# Patient Record
Sex: FEMALE | Race: WHITE | Employment: OTHER | URBAN - METROPOLITAN AREA
[De-identification: names, ages, dates, MRNs, and addresses within clinical notes are randomized per-mention and may not be internally consistent; named-entity substitution may affect disease eponyms.]

---

## 2017-01-03 ENCOUNTER — HOSPITAL ENCOUNTER (EMERGENCY)
Age: 82
Discharge: HOME | End: 2017-01-03
Attending: EMERGENCY MEDICINE
Payer: MEDICARE

## 2017-01-03 VITALS
DIASTOLIC BLOOD PRESSURE: 43 MMHG | HEART RATE: 76 BPM | SYSTOLIC BLOOD PRESSURE: 118 MMHG | RESPIRATION RATE: 18 BRPM | WEIGHT: 98.5 LBS | OXYGEN SATURATION: 100 % | HEIGHT: 64 IN | TEMPERATURE: 98.2 F | BODY MASS INDEX: 16.82 KG/M2

## 2017-01-03 DIAGNOSIS — R19.5 GUAIAC POSITIVE STOOLS: ICD-10-CM

## 2017-01-03 DIAGNOSIS — D64.9 ANEMIA, UNSPECIFIED TYPE: Primary | ICD-10-CM

## 2017-01-03 LAB
ANION GAP BLD CALC-SCNC: 7 MMOL/L (ref 3–18)
APTT PPP: 33.2 SEC (ref 23–36.4)
BASOPHILS # BLD AUTO: 0.1 K/UL (ref 0–0.1)
BASOPHILS # BLD: 1 % (ref 0–2)
BUN SERPL-MCNC: 14 MG/DL (ref 7–18)
BUN/CREAT SERPL: 21 (ref 12–20)
CALCIUM SERPL-MCNC: 9.3 MG/DL (ref 8.5–10.1)
CHLORIDE SERPL-SCNC: 103 MMOL/L (ref 100–108)
CO2 SERPL-SCNC: 26 MMOL/L (ref 21–32)
CREAT SERPL-MCNC: 0.68 MG/DL (ref 0.6–1.3)
DIFFERENTIAL METHOD BLD: ABNORMAL
EOSINOPHIL # BLD: 0 K/UL (ref 0–0.4)
EOSINOPHIL NFR BLD: 0 % (ref 0–5)
ERYTHROCYTE [DISTWIDTH] IN BLOOD BY AUTOMATED COUNT: 15.1 % (ref 11.6–14.5)
GLUCOSE SERPL-MCNC: 92 MG/DL (ref 74–99)
HCT VFR BLD AUTO: 19.3 % (ref 35–45)
HGB BLD-MCNC: 5.8 G/DL (ref 12–16)
INR PPP: 2.1 (ref 0.8–1.2)
LYMPHOCYTES # BLD AUTO: 49 % (ref 21–52)
LYMPHOCYTES # BLD: 2.5 K/UL (ref 0.9–3.6)
MCH RBC QN AUTO: 25.8 PG (ref 24–34)
MCHC RBC AUTO-ENTMCNC: 30.1 G/DL (ref 31–37)
MCV RBC AUTO: 85.8 FL (ref 74–97)
MONOCYTES # BLD: 0.4 K/UL (ref 0.05–1.2)
MONOCYTES NFR BLD AUTO: 8 % (ref 3–10)
NEUTS SEG # BLD: 2.1 K/UL (ref 1.8–8)
NEUTS SEG NFR BLD AUTO: 42 % (ref 40–73)
PLATELET # BLD AUTO: 471 K/UL (ref 135–420)
PLATELET COMMENTS,PCOM: ABNORMAL
PMV BLD AUTO: 8.7 FL (ref 9.2–11.8)
POTASSIUM SERPL-SCNC: 3.9 MMOL/L (ref 3.5–5.5)
PROTHROMBIN TIME: 22.6 SEC (ref 11.5–15.2)
RBC # BLD AUTO: 2.25 M/UL (ref 4.2–5.3)
RBC MORPH BLD: ABNORMAL
SODIUM SERPL-SCNC: 136 MMOL/L (ref 136–145)
WBC # BLD AUTO: 5.1 K/UL (ref 4.6–13.2)

## 2017-01-03 PROCEDURE — 85025 COMPLETE CBC W/AUTO DIFF WBC: CPT | Performed by: EMERGENCY MEDICINE

## 2017-01-03 PROCEDURE — 99284 EMERGENCY DEPT VISIT MOD MDM: CPT

## 2017-01-03 PROCEDURE — 86900 BLOOD TYPING SEROLOGIC ABO: CPT | Performed by: EMERGENCY MEDICINE

## 2017-01-03 PROCEDURE — P9016 RBC LEUKOCYTES REDUCED: HCPCS | Performed by: EMERGENCY MEDICINE

## 2017-01-03 PROCEDURE — 80048 BASIC METABOLIC PNL TOTAL CA: CPT | Performed by: EMERGENCY MEDICINE

## 2017-01-03 PROCEDURE — 85730 THROMBOPLASTIN TIME PARTIAL: CPT | Performed by: EMERGENCY MEDICINE

## 2017-01-03 PROCEDURE — 85610 PROTHROMBIN TIME: CPT | Performed by: EMERGENCY MEDICINE

## 2017-01-03 PROCEDURE — 36430 TRANSFUSION BLD/BLD COMPNT: CPT

## 2017-01-03 PROCEDURE — 86920 COMPATIBILITY TEST SPIN: CPT | Performed by: EMERGENCY MEDICINE

## 2017-01-03 PROCEDURE — 77030013169 SET IV BLD ICUM -A

## 2017-01-03 RX ORDER — SODIUM CHLORIDE 9 MG/ML
250 INJECTION, SOLUTION INTRAVENOUS AS NEEDED
Status: DISCONTINUED | OUTPATIENT
Start: 2017-01-03 | End: 2017-01-04 | Stop reason: HOSPADM

## 2017-01-03 NOTE — ED PROVIDER NOTES
HPI Comments: 12:12 PM Richard Ellis is a 80 y.o. female with a history of colon cancer who presents to the emergency department from chemotherapy stating she needs a blood transfusion. Pt states she was at chemo and they told her to come to the ED. Pt last received chemo on Tuesday. Pt claims that she has not seen hematochezia or melena. Pt has no SOB, CP, or dizziness. Pt takes xarelto for blood clots due to having a h/o one in abd. No other concerns at this time. Pt has a hemaglobin of 5.7;  requesting 2 units from Dr. Rahel Rosales    PCP: Farrah Kaur. Cat Ceja MD      The history is provided by the patient. Past Medical History:   Diagnosis Date    Gall bladder stones     Herniated disc     Hyperlipidemia     Rapid heartbeat        Past Surgical History:   Procedure Laterality Date    Hx cholecystectomy      Hx back surgery       x2         Family History:   Problem Relation Age of Onset    Kidney Disease Mother     Other Father      stomach ulcer       Social History     Social History    Marital status:      Spouse name: N/A    Number of children: N/A    Years of education: N/A     Occupational History    Not on file. Social History Main Topics    Smoking status: Never Smoker    Smokeless tobacco: Never Used    Alcohol use No    Drug use: No    Sexual activity: Not on file     Other Topics Concern    Not on file     Social History Narrative         ALLERGIES: Review of patient's allergies indicates no known allergies. Review of Systems   Respiratory: Negative for shortness of breath. Cardiovascular: Negative for chest pain. Gastrointestinal: Negative for abdominal pain and blood in stool. Neurological: Negative for light-headedness. All other systems reviewed and are negative.       Vitals:    01/03/17 1103 01/03/17 1230 01/03/17 1300   BP: 110/50 118/40 119/41   Pulse: 64 68 67   Resp: 18 15 13   Temp: 97.4 °F (36.3 °C)     SpO2: 100% 100% 99%   Weight: 44.7 kg (98 lb 8 oz)     Height: 5' 4\" (1.626 m)              Physical Exam   Constitutional: She is oriented to person, place, and time. She appears well-developed and well-nourished. HENT:   Head: Normocephalic and atraumatic. Neck: Neck supple. No JVD present. Cardiovascular: Normal rate and regular rhythm. Pulmonary/Chest: Effort normal and breath sounds normal. No respiratory distress. Abdominal: Soft. She exhibits no distension. There is no tenderness. There is no rebound and no guarding. Genitourinary:   Genitourinary Comments: Brown stool, guaiac positive, external hemorrhoid, non-thrombosed   Musculoskeletal: She exhibits no edema. External hemorrhoids, non thrombosis      Neurological: She is alert and oriented to person, place, and time. Skin: Skin is warm and dry. No erythema. There is pallor. Psychiatric: Judgment normal.        MDM  Number of Diagnoses or Management Options  Diagnosis management comments: 79 y/o female presents with asymptomatic anemia  Will check labs, plan for transfusion. Noted guaiac positive stool, not grossly bloody, will need GI follow up. Amount and/or Complexity of Data Reviewed  Clinical lab tests: ordered and reviewed      ED Course       Procedures  Vitals:  Patient Vitals for the past 12 hrs:   Temp Pulse Resp BP SpO2   01/03/17 1300 - 67 13 119/41 99 %   01/03/17 1230 - 68 15 118/40 100 %   01/03/17 1103 97.4 °F (36.3 °C) 64 18 110/50 100 %   100%. Percentage is within normal limits. Medications ordered:   Medications   0.9% sodium chloride infusion 250 mL (not administered)         Progress notes, Consult notes or additional Procedure notes:   2:47 PM Consult with Dr. Ainsley Patel, hematology, requests to transfuse 2 units then d/c home. Will f/u about positive stool. Pt hemodynamically stable, asymptomatic anemia, given 2u PRBC. Discharged to home. Discussed return precautions with pt. Disposition:  Diagnosis:   1.  Anemia, unspecified type 2. Guaiac positive stools        Disposition: Discharge    Follow-up Information     None        Scribe Attestation:     Kellee Griffin, scribing for and in the presence of Isa Frank,  January 03, 2017 at 2:49 PM     Signed by: Belkys Silva, January 03, 2017 at 2:49 PM     Physician Attestation:   I personally performed the services described in this documentation, reviewed and edited the documentation which was dictated to the scribe in my presence, and it accurately records my words and actions.  Isa Frank,   January 03, 2017

## 2017-01-03 NOTE — ED NOTES
[facility] EMERGENCY DEPT      80 y.o. female with noted past medical history who presents to the emergency department stating she had abnl lab work and needs blood transfusion. I performed a brief evaluation, including history and physical, of the patient here in triage and I have determined that pt will need further treatment and evaluation from the main side ER physician. I have placed initial orders to help in expediting patients care. Devin Anne M.D.   [facility] Board Certified Emergency Physician

## 2017-01-03 NOTE — ED NOTES
Hourly rounding complete. Safety  Pt resting   [ x ]  On stretcher with side rails up and bed in locked position, call bell within reach  [  ]  Sitting in chair call bell within reach  [x  ]  Family at bedside  [  ] Sitting in recliner , wheels locked, call bell within reach  [  ] Sitting in results waiting area    Toileting  [x  ] pt denies need to use bathroom  [  ] pt assisted to bathroom  [  ] pt assisted with  [  ] bedpan     [  ] commode  [  ] pt independent to bathroom as needed  [  ] Urinal at bedside  [  ] Schmidt monitored    [  ] Pt incontinent of [  ] urine [  Alena Pare. Carmel care given. Ongoing Plan of Care  Plan of care and expected time for test and results reviewed with  [ x  ] Patient  [ x ] Family.     Pain Management / Comfort  [  ] dimmed lights  [ x ] warm blanket  [x  ]provided   [   ] offered  [  ] pain assessed  [  ] monitor alarms reviewed  [  ] dietary tray [  ] provided   [  ] offered and refused

## 2017-01-04 LAB
ABO + RH BLD: NORMAL
BLD PROD TYP BPU: NORMAL
BLD PROD TYP BPU: NORMAL
BLOOD GROUP ANTIBODIES SERPL: NORMAL
BPU ID: NORMAL
BPU ID: NORMAL
CALLED TO:,BCALL1: NORMAL
CROSSMATCH RESULT,%XM: NORMAL
CROSSMATCH RESULT,%XM: NORMAL
SPECIMEN EXP DATE BLD: NORMAL
STATUS OF UNIT,%ST: NORMAL
STATUS OF UNIT,%ST: NORMAL
UNIT DIVISION, %UDIV: 0
UNIT DIVISION, %UDIV: 0

## 2017-01-04 NOTE — DISCHARGE INSTRUCTIONS
Anemia: Care Instructions  Your Care Instructions    Anemia is a low level of red blood cells, which carry oxygen throughout your body. Many things can cause anemia. Lack of iron is one of the most common causes. Your body needs iron to make hemoglobin, a substance in red blood cells that carries oxygen from the lungs to your body's cells. Without enough iron, the body produces fewer and smaller red blood cells. As a result, your body's cells do not get enough oxygen, and you feel tired and weak. And you may have trouble concentrating. Bleeding is the most common cause of a lack of iron. You may have heavy menstrual bleeding or bleeding caused by conditions such as ulcers, hemorrhoids, or cancer. Regular use of aspirin or other anti-inflammatory medicines (such as ibuprofen) also can cause bleeding in some people. A lack of iron in your diet also can cause anemia, especially at times when the body needs more iron, such as during pregnancy, infancy, and the teen years. Your doctor may have prescribed iron pills. It may take several months of treatment for your iron levels to return to normal. Your doctor also may suggest that you eat foods that are rich in iron, such as meat and beans. There are many other causes of anemia. It is not always due to a lack of iron. Finding the specific cause of your anemia will help your doctor find the right treatment for you. Follow-up care is a key part of your treatment and safety. Be sure to make and go to all appointments, and call your doctor if you are having problems. It's also a good idea to know your test results and keep a list of the medicines you take. How can you care for yourself at home? · Take your medicines exactly as prescribed. Call your doctor if you think you are having a problem with your medicine. · If your doctor recommends iron pills, take them as directed:  ¨ Try to take the pills on an empty stomach about 1 hour before or 2 hours after meals. But you may need to take iron with food to avoid an upset stomach. ¨ Do not take antacids or drink milk or caffeine drinks (such as coffee, tea, or cola) at the same time or within 2 hours of the time that you take your iron. They can make it hard for your body to absorb the iron. ¨ Vitamin C (from food or supplements) helps your body absorb iron. Try taking iron pills with a glass of orange juice or some other food that is high in vitamin C, such as citrus fruits. ¨ Iron pills may cause stomach problems, such as heartburn, nausea, diarrhea, constipation, and cramps. Be sure to drink plenty of fluids, and include fruits, vegetables, and fiber in your diet each day. Iron pills often make your bowel movements dark or green. ¨ If you forget to take an iron pill, do not take a double dose of iron the next time you take a pill. ¨ Keep iron pills out of the reach of small children. An overdose of iron can be very dangerous. · Follow your doctor's advice about eating iron-rich foods. These include red meat, shellfish, poultry, eggs, beans, raisins, whole-grain bread, and leafy green vegetables. · Steam vegetables to help them keep their iron content. When should you call for help? Call 911 anytime you think you may need emergency care. For example, call if:  · You have symptoms of a heart attack. These may include:  ¨ Chest pain or pressure, or a strange feeling in the chest.  ¨ Sweating. ¨ Shortness of breath. ¨ Nausea or vomiting. ¨ Pain, pressure, or a strange feeling in the back, neck, jaw, or upper belly or in one or both shoulders or arms. ¨ Lightheadedness or sudden weakness. ¨ A fast or irregular heartbeat. After you call 911, the  may tell you to chew 1 adult-strength or 2 to 4 low-dose aspirin. Wait for an ambulance. Do not try to drive yourself. · You passed out (lost consciousness).   Call your doctor now or seek immediate medical care if:  · You have new or increased shortness of breath. · You are dizzy or lightheaded, or you feel like you may faint. · Your fatigue and weakness continue or get worse. · You have any abnormal bleeding, such as:  ¨ Nosebleeds. ¨ Vaginal bleeding that is different (heavier, more frequent, at a different time of the month) than what you are used to. ¨ Bloody or black stools, or rectal bleeding. ¨ Bloody or pink urine. Watch closely for changes in your health, and be sure to contact your doctor if:  · You do not get better as expected. Where can you learn more? Go to http://garrett-ya.info/. Enter R301 in the search box to learn more about \"Anemia: Care Instructions. \"  Current as of: February 5, 2016  Content Version: 11.1  © 9443-9688 [employer]. Care instructions adapted under license by [employer] (which disclaims liability or warranty for this information). If you have questions about a medical condition or this instruction, always ask your healthcare professional. [address] any warranty or liability for your use of this information.

## 2017-01-04 NOTE — ED NOTES
Received nursing report from Davonte Mallory, [facility]. Patient is A/O x4, resting comfortably on the stretcher at this time. Patient awaiting blood transfusion to finish then will be discharged.

## 2017-01-15 ENCOUNTER — APPOINTMENT (OUTPATIENT)
Dept: CT IMAGING | Age: 82
DRG: 371 | End: 2017-01-15
Attending: EMERGENCY MEDICINE
Payer: MEDICARE

## 2017-01-15 ENCOUNTER — APPOINTMENT (OUTPATIENT)
Dept: GENERAL RADIOLOGY | Age: 82
DRG: 371 | End: 2017-01-15
Attending: PHYSICIAN ASSISTANT
Payer: MEDICARE

## 2017-01-15 ENCOUNTER — HOSPITAL ENCOUNTER (INPATIENT)
Age: 82
LOS: 1 days | Discharge: HOME | DRG: 371 | End: 2017-01-17
Attending: EMERGENCY MEDICINE | Admitting: EMERGENCY MEDICINE
Payer: MEDICARE

## 2017-01-15 DIAGNOSIS — R10.11 ABDOMINAL PAIN, RIGHT UPPER QUADRANT: ICD-10-CM

## 2017-01-15 DIAGNOSIS — I81 PORTAL VEIN THROMBOSIS: Primary | ICD-10-CM

## 2017-01-15 DIAGNOSIS — R18.8 OTHER ASCITES: ICD-10-CM

## 2017-01-15 DIAGNOSIS — C18.2 MALIGNANT NEOPLASM OF ASCENDING COLON (HCC): ICD-10-CM

## 2017-01-15 DIAGNOSIS — R11.2 NAUSEA AND VOMITING, INTRACTABILITY OF VOMITING NOT SPECIFIED, UNSPECIFIED VOMITING TYPE: ICD-10-CM

## 2017-01-15 PROBLEM — Z92.89 HISTORY OF BLOOD TRANSFUSION: Status: ACTIVE | Noted: 2017-01-03

## 2017-01-15 LAB
ABO + RH BLD: NORMAL
ALBUMIN SERPL BCP-MCNC: 2.7 G/DL (ref 3.4–5)
ALBUMIN/GLOB SERPL: 0.8 {RATIO} (ref 0.8–1.7)
ALP SERPL-CCNC: 95 U/L (ref 45–117)
ALT SERPL-CCNC: 65 U/L (ref 13–56)
ANION GAP BLD CALC-SCNC: 10 MMOL/L (ref 3–18)
APPEARANCE UR: CLEAR
AST SERPL W P-5'-P-CCNC: 109 U/L (ref 15–37)
BACTERIA URNS QL MICRO: NEGATIVE /HPF
BASOPHILS # BLD AUTO: 0 K/UL (ref 0–0.1)
BASOPHILS # BLD: 0 % (ref 0–2)
BILIRUB SERPL-MCNC: 0.5 MG/DL (ref 0.2–1)
BILIRUB UR QL: NEGATIVE
BLOOD GROUP ANTIBODIES SERPL: NORMAL
BUN SERPL-MCNC: 14 MG/DL (ref 7–18)
BUN/CREAT SERPL: 25 (ref 12–20)
CALCIUM SERPL-MCNC: 8 MG/DL (ref 8.5–10.1)
CHLORIDE SERPL-SCNC: 104 MMOL/L (ref 100–108)
CO2 SERPL-SCNC: 26 MMOL/L (ref 21–32)
COLOR UR: YELLOW
CREAT SERPL-MCNC: 0.55 MG/DL (ref 0.6–1.3)
DIFFERENTIAL METHOD BLD: ABNORMAL
EOSINOPHIL # BLD: 0 K/UL (ref 0–0.4)
EOSINOPHIL NFR BLD: 1 % (ref 0–5)
EPITH CASTS URNS QL MICRO: ABNORMAL /LPF (ref 0–5)
ERYTHROCYTE [DISTWIDTH] IN BLOOD BY AUTOMATED COUNT: 16.1 % (ref 11.6–14.5)
GLOBULIN SER CALC-MCNC: 3.4 G/DL (ref 2–4)
GLUCOSE SERPL-MCNC: 91 MG/DL (ref 74–99)
GLUCOSE UR STRIP.AUTO-MCNC: NEGATIVE MG/DL
HCT VFR BLD AUTO: 30.4 % (ref 35–45)
HGB BLD-MCNC: 9.8 G/DL (ref 12–16)
HGB UR QL STRIP: ABNORMAL
KETONES UR QL STRIP.AUTO: 80 MG/DL
LACTATE BLD-SCNC: 0.4 MMOL/L (ref 0.4–2)
LACTATE BLD-SCNC: 1 MMOL/L (ref 0.4–2)
LEUKOCYTE ESTERASE UR QL STRIP.AUTO: ABNORMAL
LIPASE SERPL-CCNC: 91 U/L (ref 73–393)
LYMPHOCYTES # BLD AUTO: 47 % (ref 21–52)
LYMPHOCYTES # BLD: 1.4 K/UL (ref 0.9–3.6)
MCH RBC QN AUTO: 28.2 PG (ref 24–34)
MCHC RBC AUTO-ENTMCNC: 32.2 G/DL (ref 31–37)
MCV RBC AUTO: 87.4 FL (ref 74–97)
MONOCYTES # BLD: 0.1 K/UL (ref 0.05–1.2)
MONOCYTES NFR BLD AUTO: 3 % (ref 3–10)
MUCOUS THREADS URNS QL MICRO: ABNORMAL /LPF
NEUTS SEG # BLD: 1.5 K/UL (ref 1.8–8)
NEUTS SEG NFR BLD AUTO: 49 % (ref 40–73)
NITRITE UR QL STRIP.AUTO: NEGATIVE
PH UR STRIP: 5.5 [PH] (ref 5–8)
PLATELET # BLD AUTO: 274 K/UL (ref 135–420)
PMV BLD AUTO: 8.9 FL (ref 9.2–11.8)
POTASSIUM SERPL-SCNC: 3 MMOL/L (ref 3.5–5.5)
PROT SERPL-MCNC: 6.1 G/DL (ref 6.4–8.2)
PROT UR STRIP-MCNC: NEGATIVE MG/DL
RBC # BLD AUTO: 3.48 M/UL (ref 4.2–5.3)
RBC #/AREA URNS HPF: ABNORMAL /HPF (ref 0–5)
SODIUM SERPL-SCNC: 140 MMOL/L (ref 136–145)
SP GR UR REFRACTOMETRY: >1.03 (ref 1–1.03)
SPECIMEN EXP DATE BLD: NORMAL
UROBILINOGEN UR QL STRIP.AUTO: 0.2 EU/DL (ref 0.2–1)
WBC # BLD AUTO: 3 K/UL (ref 4.6–13.2)
WBC URNS QL MICRO: ABNORMAL /HPF (ref 0–4)

## 2017-01-15 PROCEDURE — 74177 CT ABD & PELVIS W/CONTRAST: CPT

## 2017-01-15 PROCEDURE — 74011250636 HC RX REV CODE- 250/636: Performed by: PHYSICIAN ASSISTANT

## 2017-01-15 PROCEDURE — 96375 TX/PRO/DX INJ NEW DRUG ADDON: CPT

## 2017-01-15 PROCEDURE — 96374 THER/PROPH/DIAG INJ IV PUSH: CPT

## 2017-01-15 PROCEDURE — 82272 OCCULT BLD FECES 1-3 TESTS: CPT | Performed by: INTERNAL MEDICINE

## 2017-01-15 PROCEDURE — 81001 URINALYSIS AUTO W/SCOPE: CPT | Performed by: PHYSICIAN ASSISTANT

## 2017-01-15 PROCEDURE — 74011000258 HC RX REV CODE- 258: Performed by: EMERGENCY MEDICINE

## 2017-01-15 PROCEDURE — 93005 ELECTROCARDIOGRAM TRACING: CPT

## 2017-01-15 PROCEDURE — 86900 BLOOD TYPING SEROLOGIC ABO: CPT | Performed by: PHYSICIAN ASSISTANT

## 2017-01-15 PROCEDURE — 96361 HYDRATE IV INFUSION ADD-ON: CPT

## 2017-01-15 PROCEDURE — 83605 ASSAY OF LACTIC ACID: CPT

## 2017-01-15 PROCEDURE — 87493 C DIFF AMPLIFIED PROBE: CPT | Performed by: PHYSICIAN ASSISTANT

## 2017-01-15 PROCEDURE — 74011000250 HC RX REV CODE- 250: Performed by: EMERGENCY MEDICINE

## 2017-01-15 PROCEDURE — 85025 COMPLETE CBC W/AUTO DIFF WBC: CPT | Performed by: PHYSICIAN ASSISTANT

## 2017-01-15 PROCEDURE — 83690 ASSAY OF LIPASE: CPT | Performed by: PHYSICIAN ASSISTANT

## 2017-01-15 PROCEDURE — 80053 COMPREHEN METABOLIC PANEL: CPT | Performed by: PHYSICIAN ASSISTANT

## 2017-01-15 PROCEDURE — 74011250636 HC RX REV CODE- 250/636: Performed by: EMERGENCY MEDICINE

## 2017-01-15 PROCEDURE — 99285 EMERGENCY DEPT VISIT HI MDM: CPT

## 2017-01-15 PROCEDURE — 74011636320 HC RX REV CODE- 636/320: Performed by: EMERGENCY MEDICINE

## 2017-01-15 RX ORDER — LANOLIN ALCOHOL/MO/W.PET/CERES
325 CREAM (GRAM) TOPICAL 2 TIMES DAILY
COMMUNITY
Start: 2016-10-07

## 2017-01-15 RX ORDER — SODIUM CHLORIDE 9 MG/ML
75 INJECTION, SOLUTION INTRAVENOUS CONTINUOUS
Status: DISCONTINUED | OUTPATIENT
Start: 2017-01-15 | End: 2017-01-16

## 2017-01-15 RX ORDER — GABAPENTIN 300 MG/1
1 CAPSULE ORAL 3 TIMES DAILY
Refills: 0 | COMMUNITY
Start: 2016-11-01

## 2017-01-15 RX ORDER — ONDANSETRON 4 MG/1
1 TABLET, ORALLY DISINTEGRATING ORAL
Refills: 0 | COMMUNITY
Start: 2016-10-25 | End: 2017-01-15

## 2017-01-15 RX ORDER — CALCIUM CARBONATE/VITAMIN D3 600MG-5MCG
1 TABLET ORAL 2 TIMES DAILY
COMMUNITY

## 2017-01-15 RX ORDER — OXYCODONE AND ACETAMINOPHEN 5; 325 MG/1; MG/1
1 TABLET ORAL
Refills: 0 | COMMUNITY
Start: 2016-11-08 | End: 2017-01-17

## 2017-01-15 RX ORDER — ASPIRIN 325 MG
500 TABLET, DELAYED RELEASE (ENTERIC COATED) ORAL DAILY
COMMUNITY

## 2017-01-15 RX ORDER — MORPHINE SULFATE 4 MG/ML
4 INJECTION, SOLUTION INTRAMUSCULAR; INTRAVENOUS ONCE
Status: COMPLETED | OUTPATIENT
Start: 2017-01-15 | End: 2017-01-15

## 2017-01-15 RX ADMIN — LIDOCAINE HYDROCHLORIDE: 10 INJECTION, SOLUTION EPIDURAL; INFILTRATION; INTRACAUDAL; PERINEURAL at 21:26

## 2017-01-15 RX ADMIN — Medication 4 MG: at 21:23

## 2017-01-15 RX ADMIN — SODIUM CHLORIDE 250 ML: 900 INJECTION, SOLUTION INTRAVENOUS at 21:19

## 2017-01-15 RX ADMIN — IOPAMIDOL 80 ML: 612 INJECTION, SOLUTION INTRAVENOUS at 20:31

## 2017-01-15 NOTE — IP AVS SNAPSHOT
[address] [address] [facility] Patient: Yolette Coe MRN: [id number] WEJ:[id number][birth year]10[id number] You are allergic to the following No active allergies Recent Documentation Height Weight BMI OB Status Smoking Status 1.626 m 44.9 kg 16.99 kg/m2 Postmenopausal Never Smoker Emergency Contacts Name Discharge Info Relation Home Work Mobile [address] CAREGIVER [3] Child [2] [phone] Richard Sierra DISCHARGE CAREGIVER [3] Spouse [3] [phone] About your hospitalization You were admitted on:  January 16, 2017 You last received care in the:  [facility] [address] You were discharged on:  January 17, 2017 Unit phone number:  [phone] Why you were hospitalized Your primary diagnosis was:  Not on File Your diagnoses also included:  Portal Vein Thrombosis, Nausea & Vomiting, Generalized Abdominal Pain, Hypertension, Anemia, Abdominal Pain, Adenocarcinoma Of Cecum (Hcc) Providers Seen During Your Hospitalizations Provider Role Specialty Primary office phone Nathalie Hubbard MD Attending Provider Emergency Medicine [phone] Gloria Coleman MD Attending Provider Emergency Medicine [phone] Demetrios Landau, MD Attending Provider Internal Medicine Number not on file Your Primary Care Physician (PCP) Primary Care Physician Office Phone Office Fax [phone] Follow-up Information Follow up With Details Comments Contact Info Ramirez Begum MD On 1/20/2017 @ 2:00 [address] [facility] 
[phone] Oleg Coates MD On 1/24/2017 @ 10:30 [address] [facility] 
[phone] Your Appointments Monday January 23, 2017 COLONOSCOPY, ENDOSCOPY with Bernardo Bundy MD  
[facility] ENDOSCOPY [address] [place] [address] ) 0 [facility] [address] Current Discharge Medication List  
  
START taking these medications Dose & Instructions Dispensing Information Comments Morning Noon Evening Bedtime  
 metroNIDAZOLE 500 mg tablet Commonly known as:  FLAGYL Your next dose is: Today, Tomorrow Other:  _________ Dose:  500 mg Take 1 Tab by mouth three (3) times daily for 10 days. Quantity:  30 Tab Refills:  0  
     
   
   
   
  
 OTHER Your next dose is: Today, Tomorrow Other:  _________ Check CBC, CMP, Mg in 4 days, results to PCP immediately. Diagnosis- colitis Quantity:  1 Each Refills:  0 Saccharomyces boulardii 250 mg capsule Commonly known as:  Blake Moser Your next dose is: Today, Tomorrow Other:  _________ Dose:  250 mg Take 1 Cap by mouth two (2) times a day for 10 days. Quantity:  20 Cap Refills:  0 CONTINUE these medications which have NOT CHANGED Dose & Instructions Dispensing Information Comments Morning Noon Evening Bedtime  
 aspirin 81 mg tablet Your next dose is: Today, Tomorrow Other:  _________ Dose:  81 mg Take 81 mg by mouth daily. Refills:  0  
     
   
   
   
  
 calcium-vitamin D 600 mg(1,500mg) -200 unit Tab Your next dose is: Today, Tomorrow Other:  _________ Dose:  1 Tab Take 1 Tab by mouth two (2) times a day. Refills:  0 CENTRUM SILVER Tab tablet Generic drug:  multivitamins-minerals-lutein Your next dose is: Today, Tomorrow Other:  _________ Dose:  1 Tab Take 1 Tab by mouth daily. Refills:  0  
     
   
   
   
  
 ferrous sulfate 325 mg (65 mg iron) tablet Your next dose is: Today, Tomorrow Other:  _________  Dose:  325 mg  
 Take 325 mg by mouth two (2) times a day. Refills:  0  
     
   
   
   
  
 FISH OIL 60- mg Cap Generic drug:  omega 3-dha-epa-fish oil Your next dose is: Today, Tomorrow Other:  _________ Dose:  500 mg Take 500 mg by mouth daily. Refills:  0  
     
   
   
   
  
 gabapentin 300 mg capsule Commonly known as:  NEURONTIN Your next dose is: Today, Tomorrow Other:  _________ Dose:  1 Cap Take 1 Cap by mouth three (3) times daily. 11/1/16, QTY#120, 30 days. Dr. Cee Makenna Refills:  0  
     
   
   
   
  
 TOPROL XL 25 mg XL tablet Generic drug:  metoprolol succinate Your next dose is: Today, Tomorrow Other:  _________ Dose:  25 mg Take 25 mg by mouth daily. Refills:  0 XARELTO 20 mg Tab tablet Generic drug:  rivaroxaban Your next dose is: Today, Tomorrow Other:  _________ Dose:  20 mg Take 20 mg by mouth daily. Indications: PREVENTION OF DEEP VEIN THROMBOSIS RECURRENCE Refills:  0 ZOCOR 20 mg tablet Generic drug:  simvastatin Your next dose is: Today, Tomorrow Other:  _________ Dose:  20 mg Take 20 mg by mouth nightly. Refills:  0 STOP taking these medications   
 calcium 600 mg Cap FISH OIL 1,000 mg Cap Generic drug:  omega-3 fatty acids-vitamin e  
   
  
 oxyCODONE-acetaminophen 5-325 mg per tablet Commonly known as:  PERCOCET ASK your doctor about these medications Dose & Instructions Dispensing Information Comments Morning Noon Evening Bedtime CRANBERRY CONCENTRATE Cap Generic drug:  vitamin c-vitamin e Your next dose is: Today, Tomorrow Other:  _________ Dose:  2 Cap Take 2 Caps by mouth two (2) times a day. Refills:  0 Where to Get Your Medications Information on where to get these meds will be given to you by the nurse or doctor. ! Ask your nurse or doctor about these medications  
  metroNIDAZOLE 500 mg tablet OTHER Saccharomyces boulardii 250 mg capsule Discharge Instructions Abdominal Pain: Care Instructions Your Care Instructions Abdominal pain has many possible causes. Some aren't serious and get better on their own in a few days. Others need more testing and treatment. If your pain continues or gets worse, you need to be rechecked and may need more tests to find out what is wrong. You may need surgery to correct the problem. Don't ignore new symptoms, such as fever, nausea and vomiting, urination problems, pain that gets worse, and dizziness. These may be signs of a more serious problem. Your doctor may have recommended a follow-up visit in the next 8 to 12 hours. If you are not getting better, you may need more tests or treatment. The doctor has checked you carefully, but problems can develop later. If you notice any problems or new symptoms, get medical treatment right away. Follow-up care is a key part of your treatment and safety. Be sure to make and go to all appointments, and call your doctor if you are having problems. It's also a good idea to know your test results and keep a list of the medicines you take. How can you care for yourself at home? · Rest until you feel better. · To prevent dehydration, drink plenty of fluids, enough so that your urine is light yellow or clear like water. Choose water and other caffeine-free clear liquids until you feel better. If you have kidney, heart, or liver disease and have to limit fluids, talk with your doctor before you increase the amount of fluids you drink. · If your stomach is upset, eat mild foods, such as rice, dry toast or crackers, bananas, and applesauce. Try eating several small meals instead of two or three large ones. · Wait until 48 hours after all symptoms have gone away before you have spicy foods, alcohol, and drinks that contain caffeine. · Do not eat foods that are high in fat. · Avoid anti-inflammatory medicines such as aspirin, ibuprofen (Advil, Motrin), and naproxen (Aleve). These can cause stomach upset. Talk to your doctor if you take daily aspirin for another health problem. When should you call for help? Call 911 anytime you think you may need emergency care. For example, call if: 
· You passed out (lost consciousness). · You pass maroon or very bloody stools. · You vomit blood or what looks like coffee grounds. · You have new, severe belly pain. Call your doctor now or seek immediate medical care if: 
· Your pain gets worse, especially if it becomes focused in one area of your belly. · You have a new or higher fever. · Your stools are black and look like tar, or they have streaks of blood. · You have unexpected vaginal bleeding. · You have symptoms of a urinary tract infection. These may include: 
¨ Pain when you urinate. ¨ Urinating more often than usual. 
¨ Blood in your urine. · You are dizzy or lightheaded, or you feel like you may faint. Watch closely for changes in your health, and be sure to contact your doctor if: 
· You are not getting better after 1 day (24 hours). Where can you learn more? Go to http://garrett-ya.info/. Enter U293 in the search box to learn more about \"Abdominal Pain: Care Instructions. \" Current as of: May 27, 2016 Content Version: 11.1 © 4523-1258 PhoneFusion. Care instructions adapted under license by [employer] (which disclaims liability or warranty for this information). If you have questions about a medical condition or this instruction, always ask your healthcare professional. [address] [address] any warranty or liability for your use of this information. Aero Glasst Activation Thank you for requesting access to manetch. Please follow the instructions below to securely access and download your online medical record. manetch allows you to send messages to your doctor, view your test results, renew your prescriptions, schedule appointments, and more. How Do I Sign Up? 1. In your internet browser, go to [email or web address] 
2. Click on the First Time User? Click Here link in the Sign In box. You will be redirect to the New Member Sign Up page. 3. Enter your manetch Access Code exactly as it appears below. You will not need to use this code after youve completed the sign-up process. If you do not sign up before the expiration date, you must request a new code. manetch Access Code: [email or web address] Expires: 4/3/2017 12:17 PM (This is the date your manetch access code will ) 4. Enter the last four digits of your Social Security Number (xxxx) and Date of Birth (mm/dd/yyyy) as indicated and click Submit. You will be taken to the next sign-up page. 5. Create a manetch ID. This will be your manetch login ID and cannot be changed, so think of one that is secure and easy to remember. 6. Create a manetch password. You can change your password at any time. 7. Enter your Password Reset Question and Answer. This can be used at a later time if you forget your password. 8. Enter your e-mail address. You will receive e-mail notification when new information is available in [address] 9. Click Sign Up. You can now view and download portions of your medical record. 10. Click the Download Summary menu link to download a portable copy of your medical information. Additional Information If you have questions, please visit the Frequently Asked Questions section of the manetch website at [email or web address] "[employer]. Advanced [employer] Design/"Glossi[employer]"hart/. Remember, manetch is NOT to be used for urgent needs. For medical emergencies, dial 911. DISCHARGE SUMMARY from Nurse The following personal items are in your possession at time of discharge: 
 
Dental Appliances: None Visual Aid: Glasses Home Medications: None Jewelry: None Clothing: At bedside Other Valuables: None PATIENT INSTRUCTIONS: 
 
 
F-face looks uneven A-arms unable to move or move unevenly S-speech slurred or non-existent T-time-call 911 as soon as signs and symptoms begin-DO NOT go Back to bed or wait to see if you get better-TIME IS BRAIN. Warning Signs of HEART ATTACK Call 911 if you have these symptoms: 
? Chest discomfort. Most heart attacks involve discomfort in the center of the chest that lasts more than a few minutes, or that goes away and comes back. It can feel like uncomfortable pressure, squeezing, fullness, or pain. ? Discomfort in other areas of the upper body. Symptoms can include pain or discomfort in one or both arms, the back, neck, jaw, or stomach. ? Shortness of breath with or without chest discomfort. ? Other signs may include breaking out in a cold sweat, nausea, or lightheadedness. Don't wait more than five minutes to call [address]! Fast action can save your life. Calling 911 is almost always the fastest way to get lifesaving treatment. Emergency Medical Services staff can begin treatment when they arrive  up to an hour sooner than if someone gets to the hospital by car. The discharge information has been reviewed with the patient. The patient verbalized understanding. Discharge medications reviewed with the patient and appropriate educational materials and side effects teaching were provided. Discharge Orders None Introducing [facility]! Zuleyma Coffman introduces MarketArt patient portal. Now you can access parts of your medical record, email your doctor's office, and request medication refills online. 1. In your internet browser, go to [email or web address] 2. Click on the First Time User? Click Here link in the Sign In box. You will see the New Member Sign Up page. 3. Enter your MarketArt Access Code exactly as it appears below. You will not need to use this code after youve completed the sign-up process. If you do not sign up before the expiration date, you must request a new code. · MarketArt Access Code: [email or web address] Expires: 4/3/2017 12:17 PM 
 
4. Enter the last four digits of your Social Security Number (xxxx) and Date of Birth (mm/dd/yyyy) as indicated and click Submit. You will be taken to the next sign-up page. 5. Create a MarketArt ID. This will be your MarketArt login ID and cannot be changed, so think of one that is secure and easy to remember. 6. Create a MarketArt password. You can change your password at any time. 7. Enter your Password Reset Question and Answer. This can be used at a later time if you forget your password. 8. Enter your e-mail address. You will receive e-mail notification when new information is available in [address] 9. Click Sign Up. You can now view and download portions of your medical record. 10. Click the Download Summary menu link to download a portable copy of your medical information. If you have questions, please visit the Frequently Asked Questions section of the MarketArt website. Remember, MarketArt is NOT to be used for urgent needs. For medical emergencies, dial 911. Now available from your iPhone and Android! General Information Please provide this summary of care documentation to your next provider. Patient Signature:  ____________________________________________________________ Date:  ____________________________________________________________  
  
[employer]to [employer] Provider Signature:  ____________________________________________________________ Date:  ____________________________________________________________

## 2017-01-15 NOTE — ED NOTES
I performed a brief evaluation, including history and physical, of the patient here in triage and I have determined that pt will need further treatment and evaluation from the main side ER physician. I have placed initial orders to help in expediting patients care. Active Colon CA, Oncologist Dr. Nam Jo. Recent transfusion for anemia 1/3/17. + Abd Pain, + Dizziness + Diarrhea. She is on Xarelto.      January 15, 2017 at 4:52 PM - CORINA Guerra        Visit Vitals    /67 (BP 1 Location: Left arm, BP Patient Position: Sitting)    Pulse 92    Temp 98.6 °F (37 °C)    Resp 18    Ht 5' 4\" (1.626 m)    Wt 44.9 kg (99 lb)    SpO2 98%    BMI 16.99 kg/m2      Noted/

## 2017-01-15 NOTE — ED PROVIDER NOTES
HPI Comments: 6:47 PM Clementene Chet Klinefelter is a 80 y.o. female with a h/o HTN, NAVID, Cecal CA, Small bowel resection, and APPY presents to the ED with her  and grandson with c/o intermittent worsening diarrhea onset several months ago s/p colectomy with anastomosis in September 2016 by Dr. Phill Barahona at [facility]. Hx of smv thrombosis on xareltoPt reports one day right sided lower abd pain with vomiting if she tries to eat. currenlty on chemo. 5fu + second agent. Pt denies chest pain, or cough. All other sx denied. No other complaints at this time. PCP-Stacy Castaneda MD      Patient is a 80 y.o. female presenting with abdominal pain, diarrhea, dizziness, and fatigue. The history is provided by the patient. Abdominal Pain    Associated symptoms include diarrhea. Diarrhea    Associated symptoms include abdominal pain. Dizziness    Associated symptoms include abdominal pain and dizziness. Fatigue   Associated symptoms include abdominal pain. Past Medical History:   Diagnosis Date    Cancer ([address] 09/30/2016     Cecal CA Stage C, Dr. Wilfred Rivas (Heme/Onc), S/P Enterectomy/SB Resection [facility] Dr. Shanna Van. 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 Wilfred Rivas (Heme/Onc), S/P Enterectomy/SB Resection [facility] Dr. Shanna Van.  Daniel     Hx back surgery       x2         Family History:   Problem Relation Age of Onset    Kidney Disease Mother     Other Father      stomach ulcer       Social History     Social History    Marital status:      Spouse name: N/A    Number of children: N/A    Years of education: N/A     Occupational History    Not on file. Social History Main Topics    Smoking status: Never Smoker    Smokeless tobacco: Never Used    Alcohol use No    Drug use: No    Sexual activity: Not on file     Other Topics Concern    Not on file     Social History Narrative         ALLERGIES: Review of patient's allergies indicates no known allergies. Review of Systems   All other systems reviewed and are negative. Vitals:    01/15/17 1645   BP: 103/67   Pulse: 92   Resp: 18   Temp: 98.6 °F (37 °C)   SpO2: 98%   Weight: 44.9 kg (99 lb)   Height: 5' 4\" (1.626 m)            Physical Exam   Constitutional: She is oriented to person, place, and time. She appears well-developed. HENT:   Head: Normocephalic and atraumatic. Eyes: EOM are normal. Pupils are equal, round, and reactive to light. Neck: Normal range of motion. Neck supple. Cardiovascular: Normal rate, regular rhythm and normal heart sounds. Exam reveals no friction rub. No murmur heard. Pulmonary/Chest: Effort normal and breath sounds normal. No respiratory distress. She has no wheezes. Abdominal: Soft. She exhibits no distension. There is tenderness. There is no rebound and no guarding. Right lower quad tenderness. Musculoskeletal: Normal range of motion. Neurological: She is alert and oriented to person, place, and time. Skin: Skin is warm and dry. Psychiatric: She has a normal mood and affect. Her behavior is normal. Thought content normal.        MDM  Number of Diagnoses or Management Options  Diagnosis management comments:  A 11year-old female status post colectomy secondary to colon cancer in September presents with abdominal pain diarrhea abdominal pain has been persistent for a few weeks now. History of anemia status post recent blood transfusion here a few days ago. H&H looks stable here but she does have significant right lower abdominal pain.   Had appy and mike in distant past.   Turned over to dr Subha Billingsley pending ct. ED Course       Procedures                 Scribe Attestation  Dougie Carrillo scribing for and in the presence of Concha Ashraf MD 6:53 PM, 01/15/17. Physician Attestation  I personally performed the services described in the documentation, reviewed the documentation, as recorded by the scribe in my presence, and it accurately and completely records my words and actions.     Concha Ashraf MD 6:53 PM 01/15/17        Signed by : Belkys Almaguer, 01/15/17 at 6:53 PM

## 2017-01-15 NOTE — IP AVS SNAPSHOT
Current Discharge Medication List  
  
Take these medications at their scheduled times Dose & Instructions Dispensing Information Comments Morning Noon Evening Bedtime  
 aspirin 81 mg tablet Your next dose is: Today, Tomorrow Other:  ____________ Dose:  81 mg Take 81 mg by mouth daily. Refills:  0  
     
   
   
   
  
 calcium-vitamin D 600 mg(1,500mg) -200 unit Tab Your next dose is: Today, Tomorrow Other:  ____________ Dose:  1 Tab Take 1 Tab by mouth two (2) times a day. Refills:  0 CENTRUM SILVER Tab tablet Generic drug:  multivitamins-minerals-lutein Your next dose is: Today, Tomorrow Other:  ____________ Dose:  1 Tab Take 1 Tab by mouth daily. Refills:  0  
     
   
   
   
  
 ferrous sulfate 325 mg (65 mg iron) tablet Your next dose is: Today, Tomorrow Other:  ____________ Dose:  325 mg Take 325 mg by mouth two (2) times a day. Refills:  0  
     
   
   
   
  
 FISH OIL 60- mg Cap Generic drug:  omega 3-dha-epa-fish oil Your next dose is: Today, Tomorrow Other:  ____________ Dose:  500 mg Take 500 mg by mouth daily. Refills:  0  
     
   
   
   
  
 gabapentin 300 mg capsule Commonly known as:  NEURONTIN Your next dose is: Today, Tomorrow Other:  ____________ Dose:  1 Cap Take 1 Cap by mouth three (3) times daily. 11/1/16, QTY#120, 30 days. Dr. Maximiliano Kyle Refills:  0  
     
   
   
   
  
 metroNIDAZOLE 500 mg tablet Commonly known as:  FLAGYL Your next dose is: Today, Tomorrow Other:  ____________ Dose:  500 mg Take 1 Tab by mouth three (3) times daily for 10 days. Quantity:  30 Tab Refills:  0 Saccharomyces boulardii 250 mg capsule Commonly known as:  Blake Controls Your next dose is: Today, Tomorrow Other:  ____________ Dose:  250 mg Take 1 Cap by mouth two (2) times a day for 10 days. Quantity:  20 Cap Refills:  0  
     
   
   
   
  
 TOPROL XL 25 mg XL tablet Generic drug:  metoprolol succinate Your next dose is: Today, Tomorrow Other:  ____________ Dose:  25 mg Take 25 mg by mouth daily. Refills:  0 XARELTO 20 mg Tab tablet Generic drug:  rivaroxaban Your next dose is: Today, Tomorrow Other:  ____________ Dose:  20 mg Take 20 mg by mouth daily. Indications: PREVENTION OF DEEP VEIN THROMBOSIS RECURRENCE Refills:  0 ZOCOR 20 mg tablet Generic drug:  simvastatin Your next dose is: Today, Tomorrow Other:  ____________ Dose:  20 mg Take 20 mg by mouth nightly. Refills:  0 Take these medications as directed Dose & Instructions Dispensing Information Comments Morning Noon Evening Bedtime OTHER Your next dose is: Today, Tomorrow Other:  ____________ Check CBC, CMP, Mg in 4 days, results to PCP immediately. Diagnosis- colitis Quantity:  1 Each Refills:  0 ASK your doctor about these medications Dose & Instructions Dispensing Information Comments Morning Noon Evening Bedtime CRANBERRY CONCENTRATE Cap Generic drug:  vitamin c-vitamin e Your next dose is: Today, Tomorrow Other:  ____________ Dose:  2 Cap Take 2 Caps by mouth two (2) times a day. Refills:  0 Where to Get Your Medications Information about where to get these medications is not yet available ! Ask your nurse or doctor about these medications  
  metroNIDAZOLE 500 mg tablet OTHER Saccharomyces boulardii 250 mg capsule

## 2017-01-15 NOTE — Clinical Note
Status[de-identified] Inpatient [101] Type of Bed: Medical [8] Inpatient Hospitalization Certified Necessary for the Following Reasons: 3. Patient receiving treatment that can only be provided in an inpatient setting (further clarification in H&P documentation) Admitting Diagnosis: Portal vein thrombosis [452. ICD-9-CM] Admitting Diagnosis: Nausea & vomiting [231209] Admitting Physician: Karla Guerrier Attending Physician: Karla Guerrier Estimated Length of Stay: > or = to 2 Midnights Discharge Plan[de-identified] Home with Office Follow-up

## 2017-01-15 NOTE — ED TRIAGE NOTES
Pt  With family member,  C/o abdominal pain  Started  Yesterday,  Diarrhea for weeks,  On chemo for Colon CA post resection, last chemo last Tuesday,  Also c/o generalize weakness,  Dizzy,  Pt, on Xarelto

## 2017-01-16 PROBLEM — D64.9 ANEMIA: Status: ACTIVE | Noted: 2017-01-16

## 2017-01-16 PROBLEM — R10.9 ABDOMINAL PAIN: Status: ACTIVE | Noted: 2017-01-16

## 2017-01-16 PROBLEM — R10.84 GENERALIZED ABDOMINAL PAIN: Status: ACTIVE | Noted: 2017-01-16

## 2017-01-16 LAB
ATRIAL RATE: 84 BPM
BACTERIA SPEC CULT: NORMAL
BACTERIA SPEC CULT: NORMAL
CALCULATED P AXIS, ECG09: 100 DEGREES
CALCULATED R AXIS, ECG10: -44 DEGREES
CALCULATED T AXIS, ECG11: 21 DEGREES
DIAGNOSIS, 93000: NORMAL
HEMOCCULT STL QL: POSITIVE
P-R INTERVAL, ECG05: 166 MS
Q-T INTERVAL, ECG07: 378 MS
QRS DURATION, ECG06: 74 MS
QTC CALCULATION (BEZET), ECG08: 446 MS
SERVICE CMNT-IMP: NORMAL
VENTRICULAR RATE, ECG03: 84 BPM

## 2017-01-16 PROCEDURE — 74011250637 HC RX REV CODE- 250/637: Performed by: INTERNAL MEDICINE

## 2017-01-16 PROCEDURE — 74011000258 HC RX REV CODE- 258: Performed by: EMERGENCY MEDICINE

## 2017-01-16 PROCEDURE — 74011250636 HC RX REV CODE- 250/636: Performed by: EMERGENCY MEDICINE

## 2017-01-16 PROCEDURE — 76450000000

## 2017-01-16 PROCEDURE — 74011250636 HC RX REV CODE- 250/636: Performed by: INTERNAL MEDICINE

## 2017-01-16 PROCEDURE — 65270000029 HC RM PRIVATE

## 2017-01-16 PROCEDURE — 74011250637 HC RX REV CODE- 250/637: Performed by: EMERGENCY MEDICINE

## 2017-01-16 PROCEDURE — 74011000250 HC RX REV CODE- 250: Performed by: EMERGENCY MEDICINE

## 2017-01-16 PROCEDURE — 74011250637 HC RX REV CODE- 250/637: Performed by: HOSPITALIST

## 2017-01-16 RX ORDER — ONDANSETRON 2 MG/ML
4 INJECTION INTRAMUSCULAR; INTRAVENOUS
Status: DISCONTINUED | OUTPATIENT
Start: 2017-01-16 | End: 2017-01-17 | Stop reason: HOSPADM

## 2017-01-16 RX ORDER — TRAZODONE HYDROCHLORIDE 50 MG/1
25 TABLET ORAL
Status: COMPLETED | OUTPATIENT
Start: 2017-01-16 | End: 2017-01-16

## 2017-01-16 RX ORDER — METRONIDAZOLE 500 MG/1
500 TABLET ORAL 3 TIMES DAILY
Status: DISCONTINUED | OUTPATIENT
Start: 2017-01-16 | End: 2017-01-17 | Stop reason: HOSPADM

## 2017-01-16 RX ORDER — METOPROLOL SUCCINATE 25 MG/1
25 TABLET, EXTENDED RELEASE ORAL DAILY
Status: DISCONTINUED | OUTPATIENT
Start: 2017-01-16 | End: 2017-01-17 | Stop reason: HOSPADM

## 2017-01-16 RX ORDER — GABAPENTIN 300 MG/1
300 CAPSULE ORAL 3 TIMES DAILY
Status: DISCONTINUED | OUTPATIENT
Start: 2017-01-16 | End: 2017-01-17 | Stop reason: HOSPADM

## 2017-01-16 RX ORDER — GUAIFENESIN 100 MG/5ML
81 LIQUID (ML) ORAL DAILY
Status: DISCONTINUED | OUTPATIENT
Start: 2017-01-16 | End: 2017-01-17 | Stop reason: HOSPADM

## 2017-01-16 RX ORDER — HYDROMORPHONE HYDROCHLORIDE 1 MG/ML
0.5 INJECTION, SOLUTION INTRAMUSCULAR; INTRAVENOUS; SUBCUTANEOUS
Status: DISCONTINUED | OUTPATIENT
Start: 2017-01-16 | End: 2017-01-17 | Stop reason: HOSPADM

## 2017-01-16 RX ORDER — NALOXONE HYDROCHLORIDE 0.4 MG/ML
0.4 INJECTION, SOLUTION INTRAMUSCULAR; INTRAVENOUS; SUBCUTANEOUS AS NEEDED
Status: DISCONTINUED | OUTPATIENT
Start: 2017-01-16 | End: 2017-01-17 | Stop reason: HOSPADM

## 2017-01-16 RX ORDER — SIMVASTATIN 20 MG/1
20 TABLET, FILM COATED ORAL
Status: DISCONTINUED | OUTPATIENT
Start: 2017-01-16 | End: 2017-01-17 | Stop reason: HOSPADM

## 2017-01-16 RX ORDER — ACETAMINOPHEN 325 MG/1
650 TABLET ORAL
Status: DISCONTINUED | OUTPATIENT
Start: 2017-01-16 | End: 2017-01-17 | Stop reason: HOSPADM

## 2017-01-16 RX ADMIN — GABAPENTIN 300 MG: 300 CAPSULE ORAL at 16:36

## 2017-01-16 RX ADMIN — ASPIRIN 81 MG 81 MG: 81 TABLET ORAL at 09:15

## 2017-01-16 RX ADMIN — TRAZODONE HYDROCHLORIDE 25 MG: 50 TABLET ORAL at 00:49

## 2017-01-16 RX ADMIN — RIVAROXABAN 20 MG: 20 TABLET, FILM COATED ORAL at 11:08

## 2017-01-16 RX ADMIN — METOPROLOL SUCCINATE 25 MG: 25 TABLET, EXTENDED RELEASE ORAL at 11:08

## 2017-01-16 RX ADMIN — SODIUM CHLORIDE 75 ML/HR: 900 INJECTION, SOLUTION INTRAVENOUS at 00:55

## 2017-01-16 RX ADMIN — METRONIDAZOLE 500 MG: 500 TABLET ORAL at 16:36

## 2017-01-16 RX ADMIN — SODIUM CHLORIDE 500 ML: 900 INJECTION, SOLUTION INTRAVENOUS at 05:47

## 2017-01-16 RX ADMIN — GABAPENTIN 300 MG: 300 CAPSULE ORAL at 21:29

## 2017-01-16 RX ADMIN — LIDOCAINE HYDROCHLORIDE: 10 INJECTION, SOLUTION EPIDURAL; INFILTRATION; INTRACAUDAL; PERINEURAL at 03:34

## 2017-01-16 RX ADMIN — SODIUM CHLORIDE: 900 INJECTION, SOLUTION INTRAVENOUS at 05:31

## 2017-01-16 RX ADMIN — METRONIDAZOLE 500 MG: 500 TABLET ORAL at 21:29

## 2017-01-16 RX ADMIN — GABAPENTIN 300 MG: 300 CAPSULE ORAL at 11:08

## 2017-01-16 RX ADMIN — SIMVASTATIN 20 MG: 20 TABLET, FILM COATED ORAL at 03:46

## 2017-01-16 RX ADMIN — SIMVASTATIN 20 MG: 20 TABLET, FILM COATED ORAL at 21:29

## 2017-01-16 RX ADMIN — MULTIPLE VITAMINS W/ MINERALS TAB 1 TABLET: TAB at 09:15

## 2017-01-16 RX ADMIN — METRONIDAZOLE 500 MG: 500 TABLET ORAL at 09:15

## 2017-01-16 NOTE — ED NOTES
Bedside shift change report given to Kanika Caceres RN  (oncoming nurse) by Cha Shaw RN (offgoing nurse). Report included the following information SBAR, ED Summary and Recent Results.

## 2017-01-16 NOTE — PROGRESS NOTES
Briana Lowery [address]  [phone]    Date Consult Received: 01/16/17  10:16am  Consult placed by:  Dr. Elsa Conde for consult: Care Decisions    Patient summary:  Pollo Townsend is a 80y.o. 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 They are currently in this location: ER12/12.     Is this Primary Palliative Care?  __ Basic pain management   __ Initial resuscitation discussion  __ Routine advance care planning (advance directive, healthcare power of )   __ Basic questions about hospice benefit/services  __ Entering and managing comfort care orders for patients who are comfort care only  __ Following through with details of post-discharge disposition once goals are clear    IF YES,  ACTION:     Is this consultation out of scope of Palliative Medicine?   __ Chronic nonmalignant pain with no serious/life-limiting illness   __ Assessment and management of a substance-abuse disorder    IF YES, ACTION:    Is this a specialty Palliative Medicine Consultation?  __ Advanced pain management  in patients with advanced illness  _X_ Communication about prognosis in advanced illness including care options  __ Complex resuscitation discussions  __ ED consultations that by addressing care goals may impact location of admission  __ Family meeting in ICU patients with limited prognosis or advanced illness   __ Psychosocial and spiritual support for patients and families with distress from advanced illness    Is this consultation:  __ Emergent*  PM Team notified to see by close of business day*  *Patient experiencing intolerable escalating symptoms due to advanced illness    __ Urgent**  PM Team notified to see within 24 hours   **Emergent or Urgent is NOT based on time of discharge    _X_ Routine  PM Team reviews medical information, introduces service, and schedules meeting time within 72 hours    ACTION/Notes:  _X_ Advance Care Planning Flowsheet assessed and updated - No AMD on file. Spouse and son listed on contact information. Team will see once admitted. _X_ Referring team notified of actions      Palliative Medicine  [place]: [phone]  [facility]: [phone])      Resuscitation Status: Full Code   Durable DNR addressed?   [] Yes   [] No    [x] Not Applicable    Advance Care Planning 1/16/2017   Patient's Healthcare Decision Maker is: Legal Next of Clementcaradrienne 69   Primary Decision Maker Name Srinivas Morgan   Primary Decision Maker Phone Number [phone]   Primary Decision Maker Relationship to Patient Adult child   Secondary Decision Maker Name [address] [place] [address] Phone Number [phone]   Secondary Decision Maker Relationship to Patient Spouse   Confirm Advance Directive None   Patient Would Like to Complete Advance Directive No

## 2017-01-16 NOTE — PROGRESS NOTES
Patient with known h/o Portal vein thrombosis. On Xarelto. Now with C diff colitis. On flagyl. Iv fluids, liquid diet. Monitor labs, d/w Dr Oscar Reich.  Palliative care consult

## 2017-01-16 NOTE — ED NOTES
Pt complaining of abdominal pain, nausea, vomiting. Pt is AxOx4, NAD, V/S stable. Pt states that she has a history of intestinal cancer that required the removal of 4 inches of intestine.

## 2017-01-16 NOTE — ED NOTES
Hourly rounding performed. Pt resting comfortably in bed, NAD, AxOx4. Pt denies need for toileting, call bell in reach.

## 2017-01-16 NOTE — CONSULTS
Vascular Surgery Consult      Patient: Luis Allison MRN: [id number]  CSN: [id number]      YOB: 1931    Age: 80 y.o. Sex: female      DOA: 1/15/2017       HPI:     Luis Allison is a 80 y.o. female with PMH of HTN, HLD, cecal cancer s/p SB resection, and history of SMV thrombosis on ASA and Xarelto who is being admitted for diarrhea and abdominal pain. She is receiving chemotherapy per Dr Arsalan Love. Reportedly she has had diarrhea for several months but her pain worsened today, >>on right side. She denies nausea/vomiting but does admit to poor appetite. CT scan was obtained showed large thrombus in the portal venous system extending from the main portal vein into SMV with surrounding stranding and ? Pancreatic inflammation. Patient states that she was scheduled to see a GI doctor for some testing this week but cannot remember what type of testing. 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 Acute, occlusive thrombus present in the superior mesenteric vein. The superior mesenteric and inferior mesenteric arteries are patent, with normal flow velocities and Doppler profiles. The thrombus in the superior mesenteric vein is known from a portal-hepatic study dated 09/28/2016 and compared to those images now appears to have propagated proximally to possibly involve the portal vein confluence\". Patient states her pain is improved since admission but diarrhea persists. No fever/chills. Past Medical History   Diagnosis Date    Cancer ([facility] [address] 09/30/2016     Cecal CA Stage C, Dr. Edgard De Oliveira (Heme/Onc), S/P Enterectomy/SB Resection [facility] Dr. Santiago Horton.  Sanchez     Compression fracture of L5 lumbar vertebra ([facility] [address]     Gall bladder stones     Herniated disc     History of blood transfusion 01/03/2017     2 Units    Hyperlipidemia     Hypertension     Rapid heartbeat     Superior mesenteric vein thrombosis ([facility] [address] 09/27/2016    Thromboembolus ([facility] [address]      not sure       Past Surgical History   Procedure Laterality Date    Hx cholecystectomy      Hx small bowel resection  09/30/2016     Cecal CA Stage C, Dr. Ricky Rodriguez (Heme/Onc), S/P Enterectomy/SB Resection [facility] Dr. Loretta Valle. Chetan Lore Hx back surgery       x2       Family History   Problem Relation Age of Onset    Kidney Disease Mother     Other Father      stomach ulcer       Social History     Social History    Marital status:      Spouse name: N/A    Number of children: N/A    Years of education: N/A     Social History Main Topics    Smoking status: Never Smoker    Smokeless tobacco: Never Used    Alcohol use No    Drug use: No    Sexual activity: Not Asked     Other Topics Concern    None     Social History Narrative       Prior to Admission medications    Medication Sig Start Date End Date Taking? Authorizing Provider   gabapentin (NEURONTIN) 300 mg capsule Take 1 Cap by mouth three (3) times daily. 11/1/16, QTY#120, 30 days. Dr. Grace Jenkins 11/1/16  Yes Phys Other, MD   oxyCODONE-acetaminophen (PERCOCET) 5-325 mg per tablet Take 1 Tab by mouth every four (4) hours as needed. 11/8/16, QTY#90, 15 days, Dr. Renae Mccrary. Cortes 11/8/16  Yes Phys Other, MD   ferrous sulfate 325 mg (65 mg iron) tablet Take 325 mg by mouth two (2) times a day. 10/7/16  Yes Phys Other, MD   omega 3-dha-epa-fish oil (FISH OIL) 60- mg cap Take 500 mg by mouth daily. Yes Phys Other, MD   calcium-vitamin D 600 mg(1,500mg) -200 unit tab Take 1 Tab by mouth two (2) times a day. Yes Phys Other, MD   rivaroxaban (XARELTO) 20 mg tab tablet Take 20 mg by mouth daily. Indications: PREVENTION OF DEEP VEIN THROMBOSIS RECURRENCE   Yes Historical Provider   metoprolol-XL (TOPROL XL) 25 mg XL tablet Take 25 mg by mouth daily.      Yes Historical Provider   vitamin c-vitamin e (CRANBERRY CONCENTRATE) cap Take 2 Caps by mouth two (2) times a day. Yes Historical Provider   simvastatin (ZOCOR) 20 mg tablet Take 20 mg by mouth nightly. Yes Historical Provider   aspirin 81 mg tablet Take 81 mg by mouth daily. Yes Historical Provider   multivitamins-minerals-lutein (CENTRUM SILVER) Tab Take 1 Tab by mouth daily. Yes Historical Provider   calcium 600 mg cap Take 1 Cap by mouth daily. Historical Provider   omega-3 fatty acids-vitamin e (FISH OIL) 1,000 mg cap Take 1 Cap by mouth daily. Historical Provider       No Known Allergies    Review of Systems  Constitutional: negative   HEENT: negative   Respiratory: negative   Cardiovascular: negative   Gastrointestinal: positive for abd pain (>>right side), chronic diarrhea  Genitourinary:negative   Hematologic/lymphatic: negative   Musculoskeletal:negative   Neurological: negative   Behavioral/Psych: negative   Endocrine: negative   Allergic/Immunologic: negative        Physical Exam:      Visit Vitals    /51    Pulse 78    Temp 98.6 °F (37 °C)    Resp 14    Ht 5' 4\" (1.626 m)    Wt 99 lb (44.9 kg)    SpO2 99%    BMI 16.99 kg/m2       Physical Exam:  General: elderly female in no acute distress   HEENT: EOMI, no scleral icterus is noted. Cardiovascular: RRR  Pulmonary: No increased work or breathing is noted. Abdomen: soft, ? Mild distension, mild TTP, >>Right side. No rebound or guarding is noted. Extremities: Warm and well perfused bilaterally. Pt has no BLE edema.  Palpable pedal pulses  Neuro: Cranial nerves II through XII are grossly intact   Integument: No ulcerations are identified visibly      Data Review:    CBC:   Lab Results   Component Value Date/Time    WBC 3.0 01/15/2017 05:10 PM    RBC 3.48 01/15/2017 05:10 PM    HGB 9.8 01/15/2017 05:10 PM    HCT 30.4 01/15/2017 05:10 PM    PLATELET 670 76/00/3458 05:10 PM      BMP:   Lab Results   Component Value Date/Time    Glucose 91 01/15/2017 05:10 PM    Sodium 140 01/15/2017 05:10 PM    Potassium 3.0 01/15/2017 05:10 PM    Chloride 104 01/15/2017 05:10 PM    CO2 26 01/15/2017 05:10 PM    BUN 14 01/15/2017 05:10 PM    Creatinine 0.55 01/15/2017 05:10 PM    Calcium 8.0 01/15/2017 05:10 PM     Coagulation:   Lab Results   Component Value Date/Time    Prothrombin time 22.6 01/03/2017 12:20 PM    INR 2.1 01/03/2017 12:20 PM    aPTT 33.2 01/03/2017 12:20 PM         Assessment/Plan     81 yo female with history of cecal cancer s/p SB resection in September of this year. Patient is on chemotherapy per Dr Jimena Mena. She is being admitted for worsening abdominal pain and chronic diarrhea. CT scan showed portal vein thrombus which upon review of chart appears to be known. She is currently on Xarelto for anticoagulation. No vascular intervention recommended at this time. Would continue anticoagulation per Heme/Onc. Please call with questions or concerns.      Active Problems:    Hypertension ()      Portal vein thrombosis (1/15/2017)      Nausea & vomiting (1/15/2017)      Generalized abdominal pain (1/16/2017)      Anemia (1/16/2017)      Abdominal pain (1/16/2017)        [address], [place]  January 16, 2017

## 2017-01-16 NOTE — ED NOTES
Spoke with Dr. Lalo Sotelo regarding pt's blood pressure and pt's positive fecal occult sample. Received verbal orders for  mL. Will continue to monitor and watch for CBC trends in morning labs.

## 2017-01-16 NOTE — H&P
History and Physical    Patient: Luis Allison MRN: [id number]  SSN: [id number]    YOB: 1931  Age: 80 y.o. Sex: female      Subjective:      Luis Allison is a 80 y.o. female with PMH of HTN ,  HLD , Cecal ca , S/p SB Resection , S/p Cholecystectomy , H/o SMV thrombosis on ASA and Xarelto being admitted for recent development of diarrhea and abd pain     ED notes and patient and family in room with her are historian     Patient developed diarrhea several months ago and to day she developed severe abd pain , generalized but more over right side . CT Abd in ED suggested PORTAL VEIN THROMBOSIS ,? pancreatic inflammation , sigmoid diverticulosis     Patient denies NV , Cp , Palpitation , SOB, Cough , Wheezing , feels better with pain meds     Patient is on chemo therapy by Dr Edgard De Oliveira - per patient     Full CODE   DVT prophylaxis - on Xarelto continue           Past Medical History   Diagnosis Date    Cancer ([facility] [address] 09/30/2016     Cecal CA Stage C, Dr. Edgard De Oliveira (Heme/Onc), S/P Enterectomy/SB Resection [facility] Dr. Santiago Horton. Sanchez     Compression fracture of L5 lumbar vertebra ([facility] [address]     Gall bladder stones     Herniated disc     History of blood transfusion 01/03/2017     2 Units    Hyperlipidemia     Hypertension     Rapid heartbeat     Superior mesenteric vein thrombosis ([address] 09/27/2016    Thromboembolus ([facility] [address]      not sure     Past Surgical History   Procedure Laterality Date    Hx cholecystectomy      Hx small bowel resection  09/30/2016     Cecal CA Stage C, Dr. Edgard De Oliveira (Heme/Onc), S/P Enterectomy/SB Resection [facility] Dr. Santiago Horton.  Bran Torrez Hx back surgery       x2      Family History   Problem Relation Age of Onset    Kidney Disease Mother     Other Father      stomach ulcer     Social History   Substance Use Topics    Smoking status: Never Smoker    Smokeless tobacco: Never Used    Alcohol use No      Prior to Admission medications    Medication Sig Start Date End Date Taking? Authorizing Provider   gabapentin (NEURONTIN) 300 mg capsule Take 1 Cap by mouth three (3) times daily. 11/1/16, QTY#120, 30 days. Dr. Clarissa Rose 11/1/16  Yes Phys Other, MD   oxyCODONE-acetaminophen (PERCOCET) 5-325 mg per tablet Take 1 Tab by mouth every four (4) hours as needed. 11/8/16, QTY#90, 15 days, Dr. Cristopher Guerrero. Haroon Salter 11/8/16  Yes Phys Other, MD   ferrous sulfate 325 mg (65 mg iron) tablet Take 325 mg by mouth two (2) times a day. 10/7/16  Yes Phys Other, MD   omega 3-dha-epa-fish oil (FISH OIL) 60- mg cap Take 500 mg by mouth daily. Yes Phys Other, MD   calcium-vitamin D 600 mg(1,500mg) -200 unit tab Take 1 Tab by mouth two (2) times a day. Yes Phys Other, MD   rivaroxaban (XARELTO) 20 mg tab tablet Take 20 mg by mouth daily. Indications: PREVENTION OF DEEP VEIN THROMBOSIS RECURRENCE   Yes Historical Provider   metoprolol-XL (TOPROL XL) 25 mg XL tablet Take 25 mg by mouth daily. Yes Historical Provider   vitamin c-vitamin e (CRANBERRY CONCENTRATE) cap Take 2 Caps by mouth two (2) times a day. Yes Historical Provider   simvastatin (ZOCOR) 20 mg tablet Take 20 mg by mouth nightly. Yes Historical Provider   aspirin 81 mg tablet Take 81 mg by mouth daily. Yes Historical Provider   multivitamins-minerals-lutein (CENTRUM SILVER) Tab Take 1 Tab by mouth daily. Yes Historical Provider   calcium 600 mg cap Take 1 Cap by mouth daily. Historical Provider   omega-3 fatty acids-vitamin e (FISH OIL) 1,000 mg cap Take 1 Cap by mouth daily. Historical Provider        No Known Allergies    Review of Systems:  A comprehensive review of systems was negative except for that written in the History of Present Illness.     Objective:     Vitals:    01/15/17 1930 01/15/17 2000 01/15/17 2045 01/15/17 2145   BP: 139/55 124/46 123/52 118/50   Pulse: 79 79 76 78   Resp: 9 14 13 14   Temp:       SpO2: 98% 100% 100% 99%   Weight:       Height:            Physical Exam:  General appearance - alert, well appearing, and in no distress, oriented to person, place, and time and anxious  Mental status - normal mood, behavior, speech, dress, motor activity, and thought processes  Eyes - pupils equal and reactive, extraocular eye movements intact  Ears - bilateral TM's and external ear canals normal  Nose - normal and patent, no erythema, discharge or polyps  Mouth - mucous membranes moist, pharynx normal without lesions  Neck - supple, no significant adenopathy  Chest - clear to auscultation, no wheezes, rales or rhonchi, symmetric air entry  Heart - normal rate, regular rhythm, normal S1, S2, no murmurs, rubs, clicks or gallops  Abdomen - tenderness noted generalized , BS present   Neurological - alert, oriented, normal speech, no focal findings or movement disorder noted  Musculoskeletal - no joint tenderness, deformity or swelling  Extremities - peripheral pulses normal, no pedal edema, no clubbing or cyanosis  Skin - normal coloration and turgor, no rashes, no suspicious skin lesions noted     Assessment:     Hospital Problems  Date Reviewed: 1/16/2017          Codes Class Noted POA    Generalized abdominal pain ICD-10-CM: R10.84  ICD-9-CM: 789.07  1/16/2017 Unknown        Anemia ICD-10-CM: D64.9  ICD-9-CM: 285.9  1/16/2017 Unknown        Abdominal pain ICD-10-CM: R10.9  ICD-9-CM: 789.00  1/16/2017 Unknown        Hypertension ICD-10-CM: I10  ICD-9-CM: 401.9  Unknown Yes        Portal vein thrombosis ICD-10-CM: S30  ICD-9-CM: 387  1/15/2017 Unknown        Nausea & vomiting ICD-10-CM: R11.2  ICD-9-CM: 787.01  1/15/2017 Unknown              CBC:  Lab Results   Component Value Date/Time    WBC 3.0 01/15/2017 05:10 PM    HGB 9.8 01/15/2017 05:10 PM    HCT 30.4 01/15/2017 05:10 PM    PLATELET 637 20/03/2572 05:10 PM    MCV 87.4 01/15/2017 05:10 PM        CMP:  Lab Results   Component Value Date/Time    Sodium 140 01/15/2017 05:10 PM    Potassium 3.0 01/15/2017 05:10 PM    Chloride 104 01/15/2017 05:10 PM    CO2 26 01/15/2017 05:10 PM    Anion gap 10 01/15/2017 05:10 PM    Glucose 91 01/15/2017 05:10 PM    BUN 14 01/15/2017 05:10 PM    Creatinine 0.55 01/15/2017 05:10 PM    BUN/Creatinine ratio 25 01/15/2017 05:10 PM    GFR est AA >60 01/15/2017 05:10 PM    GFR est non-AA >60 01/15/2017 05:10 PM    Calcium 8.0 01/15/2017 05:10 PM    ALT 65 01/15/2017 05:10 PM     01/15/2017 05:10 PM    Alk. phosphatase 95 01/15/2017 05:10 PM    Protein, total 6.1 01/15/2017 05:10 PM    Albumin 2.7 01/15/2017 05:10 PM    Globulin 3.4 01/15/2017 05:10 PM    A-G Ratio 0.8 01/15/2017 05:10 PM        PT/INR  Lab Results   Component Value Date/Time    INR 2.1 01/03/2017 12:20 PM    Prothrombin time 22.6 01/03/2017 12:20 PM            EKG:   Results for orders placed or performed in visit on 12/27/12   AMB POC EKG ROUTINE W/ 12 LEADS, INTER & REP     Status: None    Narrative    Normal sinus rhythm with rate 60. There is a left anterior  fascicular block and some very minor nonspecific anterolateral  ST-T changes.           Plan:   Acute abd pain   - most likely from portal vein thrombosis   - R/o pancreatitis - follow Lipase level   - Clear liquid diet   - pain management   - Vascular surgery consulted by ED - suggested continue Xarelto     Diarrhea   - R/o C dif - unlikely     Hypokalemia   - Replace K+ with IVF     Anemia   - in past needed BT   - Monitor H/H   - Stool occult     Continue other home meds         Signed By: Radha Ozuna MD     January 16, 2017

## 2017-01-16 NOTE — ED NOTES
Pt signed out to me by Dr. Ghazala Tracey pending CT result. Pt states that she is on chemo with weekly infusions and has had 3 of 8 rounds. She states she received leukovorin after the first round and \"I felt better than I had in a long time\". She has had persistent diarrhea since the original surgery in September, denies any melena or rectal bleeding. She has some percocet at home that she has taken for pain but she hasn't really had pain since she was immediately post-op. She is on xarelto and ASA due to history of SMV thombosis. She has had nausea and vomiting since yesterday. She notes pain to her RUQ/R flank on my exam and does have tenderness without rebound or guarding. She deneis any fevers or chills. Discussed with radiology who has reviewed CT images today with CT with contrast done at [facility] in Sept.  The SMV thrombus has extended into the main portal vein. The biliary tree appears similar to previous images. Reviewed with pt and family. She does not want to be transferred to [facility], wants to stay at [facility]. She notes her pain is improved after IV morphine but she has not been able to tolerate PO in ED. Will discuss with vascular surgery. Repeat lactic acid. Reviewed previous path results, pt had moderately differentiated adenocarcinoma, 4cm cecal mass with extension into mesentery. Discussed with Dr. Yolanda Pinzon, would not recommend further anticoagulation tonight. Will consult on pt in am.    Discussed with Dr. Jeanna Moore, will admit.

## 2017-01-17 VITALS
SYSTOLIC BLOOD PRESSURE: 108 MMHG | RESPIRATION RATE: 18 BRPM | BODY MASS INDEX: 16.9 KG/M2 | HEIGHT: 64 IN | WEIGHT: 99 LBS | HEART RATE: 76 BPM | DIASTOLIC BLOOD PRESSURE: 63 MMHG | OXYGEN SATURATION: 98 % | TEMPERATURE: 97.4 F

## 2017-01-17 LAB
ANION GAP BLD CALC-SCNC: 11 MMOL/L (ref 3–18)
BASOPHILS # BLD AUTO: 0 K/UL (ref 0–0.1)
BASOPHILS # BLD: 1 % (ref 0–2)
BUN SERPL-MCNC: 13 MG/DL (ref 7–18)
BUN/CREAT SERPL: 30 (ref 12–20)
CALCIUM SERPL-MCNC: 7.6 MG/DL (ref 8.5–10.1)
CHLORIDE SERPL-SCNC: 113 MMOL/L (ref 100–108)
CO2 SERPL-SCNC: 20 MMOL/L (ref 21–32)
CREAT SERPL-MCNC: 0.44 MG/DL (ref 0.6–1.3)
DIFFERENTIAL METHOD BLD: ABNORMAL
EOSINOPHIL # BLD: 0 K/UL (ref 0–0.4)
EOSINOPHIL NFR BLD: 0 % (ref 0–5)
ERYTHROCYTE [DISTWIDTH] IN BLOOD BY AUTOMATED COUNT: 16.8 % (ref 11.6–14.5)
GLUCOSE SERPL-MCNC: 75 MG/DL (ref 74–99)
HCT VFR BLD AUTO: 24.3 % (ref 35–45)
HGB BLD-MCNC: 7.8 G/DL (ref 12–16)
LACTATE SERPL-SCNC: 0.7 MMOL/L (ref 0.4–2)
LYMPHOCYTES # BLD AUTO: 36 % (ref 21–52)
LYMPHOCYTES # BLD: 1.1 K/UL (ref 0.9–3.6)
MAGNESIUM SERPL-MCNC: 1.9 MG/DL (ref 1.8–2.4)
MCH RBC QN AUTO: 28.6 PG (ref 24–34)
MCHC RBC AUTO-ENTMCNC: 32.1 G/DL (ref 31–37)
MCV RBC AUTO: 89 FL (ref 74–97)
MONOCYTES # BLD: 0.3 K/UL (ref 0.05–1.2)
MONOCYTES NFR BLD AUTO: 11 % (ref 3–10)
NEUTS SEG # BLD: 1.5 K/UL (ref 1.8–8)
NEUTS SEG NFR BLD AUTO: 52 % (ref 40–73)
PLATELET # BLD AUTO: 243 K/UL (ref 135–420)
PMV BLD AUTO: 9.2 FL (ref 9.2–11.8)
POTASSIUM SERPL-SCNC: 3.4 MMOL/L (ref 3.5–5.5)
RBC # BLD AUTO: 2.73 M/UL (ref 4.2–5.3)
SODIUM SERPL-SCNC: 144 MMOL/L (ref 136–145)
WBC # BLD AUTO: 3 K/UL (ref 4.6–13.2)

## 2017-01-17 PROCEDURE — 74011250636 HC RX REV CODE- 250/636: Performed by: INTERNAL MEDICINE

## 2017-01-17 PROCEDURE — 83735 ASSAY OF MAGNESIUM: CPT | Performed by: EMERGENCY MEDICINE

## 2017-01-17 PROCEDURE — 80048 BASIC METABOLIC PNL TOTAL CA: CPT | Performed by: EMERGENCY MEDICINE

## 2017-01-17 PROCEDURE — 74011250637 HC RX REV CODE- 250/637: Performed by: INTERNAL MEDICINE

## 2017-01-17 PROCEDURE — 99218 HC RM OBSERVATION: CPT

## 2017-01-17 PROCEDURE — G8998 SWALLOW D/C STATUS: HCPCS

## 2017-01-17 PROCEDURE — 85025 COMPLETE CBC W/AUTO DIFF WBC: CPT | Performed by: EMERGENCY MEDICINE

## 2017-01-17 PROCEDURE — 92610 EVALUATE SWALLOWING FUNCTION: CPT

## 2017-01-17 PROCEDURE — G8997 SWALLOW GOAL STATUS: HCPCS

## 2017-01-17 PROCEDURE — 74011250637 HC RX REV CODE- 250/637: Performed by: EMERGENCY MEDICINE

## 2017-01-17 PROCEDURE — 83605 ASSAY OF LACTIC ACID: CPT | Performed by: EMERGENCY MEDICINE

## 2017-01-17 PROCEDURE — 36415 COLL VENOUS BLD VENIPUNCTURE: CPT | Performed by: EMERGENCY MEDICINE

## 2017-01-17 PROCEDURE — 74011250637 HC RX REV CODE- 250/637: Performed by: HOSPITALIST

## 2017-01-17 RX ORDER — POTASSIUM CHLORIDE 1.5 G/1.77G
20 POWDER, FOR SOLUTION ORAL
Status: COMPLETED | OUTPATIENT
Start: 2017-01-17 | End: 2017-01-17

## 2017-01-17 RX ORDER — SAME BUTANEDISULFONATE/BETAINE 400-600 MG
250 POWDER IN PACKET (EA) ORAL 2 TIMES DAILY
Qty: 20 CAP | Refills: 0 | Status: SHIPPED | OUTPATIENT
Start: 2017-01-17 | End: 2017-01-27

## 2017-01-17 RX ORDER — METRONIDAZOLE 500 MG/1
500 TABLET ORAL 3 TIMES DAILY
Qty: 30 TAB | Refills: 0 | Status: SHIPPED | OUTPATIENT
Start: 2017-01-17 | End: 2017-01-27

## 2017-01-17 RX ORDER — ACETAMINOPHEN 325 MG/1
650 TABLET ORAL
Status: COMPLETED | OUTPATIENT
Start: 2017-01-17 | End: 2017-01-17

## 2017-01-17 RX ORDER — DIPHENHYDRAMINE HCL 25 MG
25 CAPSULE ORAL
Status: COMPLETED | OUTPATIENT
Start: 2017-01-17 | End: 2017-01-17

## 2017-01-17 RX ADMIN — ASPIRIN 81 MG 81 MG: 81 TABLET ORAL at 08:54

## 2017-01-17 RX ADMIN — METRONIDAZOLE 500 MG: 500 TABLET ORAL at 16:14

## 2017-01-17 RX ADMIN — GABAPENTIN 300 MG: 300 CAPSULE ORAL at 16:14

## 2017-01-17 RX ADMIN — GABAPENTIN 300 MG: 300 CAPSULE ORAL at 08:54

## 2017-01-17 RX ADMIN — DIPHENHYDRAMINE HYDROCHLORIDE 25 MG: 25 CAPSULE ORAL at 11:44

## 2017-01-17 RX ADMIN — IRON SUCROSE 300 MG: 20 INJECTION, SOLUTION INTRAVENOUS at 13:11

## 2017-01-17 RX ADMIN — RIVAROXABAN 20 MG: 20 TABLET, FILM COATED ORAL at 08:54

## 2017-01-17 RX ADMIN — POTASSIUM CHLORIDE 20 MEQ: 1.5 POWDER, FOR SOLUTION ORAL at 16:13

## 2017-01-17 RX ADMIN — MULTIPLE VITAMINS W/ MINERALS TAB 1 TABLET: TAB at 08:55

## 2017-01-17 RX ADMIN — METRONIDAZOLE 500 MG: 500 TABLET ORAL at 08:54

## 2017-01-17 RX ADMIN — ACETAMINOPHEN 650 MG: 325 TABLET ORAL at 11:43

## 2017-01-17 NOTE — PROGRESS NOTES
Hematology / Oncology Progress Note  [facility]      Patient ID:  Lidia Carrillo SVEC  41 y.o.  1931    Admit Date: 1/15/2017    Assessment:     Active Problems:    Hypertension ()      Adenocarcinoma of cecum ([address] (10/12/2016)      Portal vein thrombosis (1/15/2017)      Nausea & vomiting (1/15/2017)      Generalized abdominal pain (2017)      Anemia (2017)      Abdominal pain (2017)    c dif    Iron deficiency anemia    Plan:     Track cbc, lytes  Add venofer today  Cont current mx    Copy of most recent outpt prog note in blue file for reference. Thanks for notification  following    Subjective:   Admitted because of severe right lower quad abd pain. Subsequently found to have c dif pos stool  Presently, denies increased freq bm, abd pain is improved  No diarrhea as of today    Objective:     Visit Vitals    BP 98/51 (BP 1 Location: Left arm, BP Patient Position: At rest)    Pulse 79    Temp 97.7 °F (36.5 °C)    Resp 18    Ht 5' 4\" (1.626 m)    Wt 44.9 kg (99 lb)    SpO2 99%    BMI 16.99 kg/m2             Temp (24hrs), Av.1 °F (36.7 °C), Min:97.7 °F (36.5 °C), Max:98.6 °F (37 °C)        Intake/Output Summary (Last 24 hours) at 17 0920  Last data filed at 17 0600   Gross per 24 hour   Intake              180 ml   Output                0 ml   Net              180 ml       Review of Systems:   Constitutional:  No Fever; No chills; No weight loss    Skin:  No rash; No itching   HEENT:  No changes in vision or hearing   Cardiovascular:  No palpitations, chest pain, angina   Respiratory:  No shortness of breath, no wheezing, no pleurisy, no cough, no  hemoptysis   Gastrointestinal:  No nausea or vomiting; No diarrhea, no dyspepsis   Genitourinary:  No dysuria; No increase in urinary frequency   Musculoskeletal:  No weakness or muscle pains   Endo:  No polyuria; no symptoms of thyroid dysfunction   Heme:  No bruising;  No bleeding, no adenopathy Neurological:  No Seizures; No focal weakness or sensory changes   Psychiatric:  No mood changes; no suicidal ideation       Physical Exam:  Vss, nad  Pale, anicteric  rrr  Lung clear  abd soft, nt  No edema      Labs:  Basic Metabolic Profile   Recent Labs      01/17/17   0420  01/15/17   1710   NA  144  140   CO2  20*  26   BUN  13  14        CBC w/Diff    Recent Labs      01/17/17   0420  01/15/17   1710   WBC  3.0*  3.0*   HCT  24.3*  30.4*    No results for input(s): BANDS, LYMPHOCYTES in the last 72 hours. No lab exists for component: SEGS     Hepatic Panel   Hepatic Function  No results found for: ALBUMIN      Coagulation   No results for input(s): INR, APTT in the last 72 hours.     No lab exists for component: PT, INREXT       Current medications reviewed    Jhony Gonzales MD MD  [facility] [phone]

## 2017-01-17 NOTE — PROGRESS NOTES
Problem: Dysphagia (Adult)  Goal: *Acute Goals and Plan of Care (Insert Text)  Rec:   Reg diet with thin liquids -- when cleared by MD  Aspiration precautions  Oral care TID  Meds as tolerated  [address] EVALUATION/DISCHARGE     Patient: Kate Burrows (80 y.o. female)  Date: 1/17/2017  Primary Diagnosis: Portal vein thrombosis  Nausea & vomiting  Abdominal pain  Portal vein thrombosis        Precautions: aspiration         ASSESSMENT :  Based on the objective data described below, the patient presents with oropharyngeal swallow fxn essentially WFL. Strength, ROM, and coordination of the orofacial musculature were all found to be [facility]. All structures were intact and symmetrical. The pt presented with adequate oral transit times on all consistencies. Mastication time was appropriate. No s/sx of aspiration noted; hyolaryngeal elevation and excursion appeared adequate on all consistencies. No oral stasis noted post swallow. The pt denied globus sensation post swallow. Pt safe for reg diet with thin liquids from an SLP standpoint when cleared by MD. No further skilled SLP services are indicated at this time. Please re-consult if needed. PLAN :  Recommendations and Planned Interventions: See above  Discharge Recommendations: Home Health, Outpatient and To Be Determined       SUBJECTIVE:   Patient stated Sometimes my stomach feels like its burning. OBJECTIVE:       Past Medical History   Diagnosis Date    Cancer ([address] 09/30/2016       Cecal CA Stage C, Dr. Rahel Rosales (Heme/Onc), S/P Enterectomy/SB Resection [facility] Dr. Phani Upton.  Sanchez     Compression fracture of L5 lumbar vertebra ([address]      Gall bladder stones      Herniated disc      History of blood transfusion 01/03/2017       2 Units    Hyperlipidemia      Hypertension      Rapid heartbeat      Superior mesenteric vein thrombosis ([address] 09/27/2016    Thromboembolus ([address]         not sure     Past Surgical History   Procedure Laterality Date    Hx cholecystectomy        Hx small bowel resection   09/30/2016       Cecal CA Stage C, Dr. Ministerio Díaz (Heme/Onc), S/P Enterectomy/SB Resection [facility] Dr. Tree Sousa. Rey Alleefrain Hx back surgery           x2     Prior Level of Function/Home Situation: private residence  [address]: Private residence  # Steps to Enter: 4  One/Two Story Residence: One story  Living Alone: No  Support Systems: Family member(s)  Patient Expects to be Discharged to[de-identified] Private residence  Current DME Used/Available at Home: [address] [place] prior to admission: reg diet with thin liquids  Current Diet:  Reg with thin   Cognitive and Communication Status:  Neurologic State: Alert  Orientation Level: Oriented X4  Cognition: Follows commands           Oral Assessment:  Oral Assessment  Labial: No impairment  Dentition: Natural;Intact  Oral Hygiene: Adequate  Lingual: No impairment  Velum: No impairment  Mandible: No impairment  P.O. Trials:  Patient Position: [facility] at [facility]  Vocal quality prior to P.O.: No impairment  Consistency Presented: Thin liquid; Solid;Puree;Pill/Tablet  How Presented: Self-fed/presented;Spoon;Straw     Bolus Acceptance: No impairment  Bolus Formation/Control: No impairment     Propulsion: No impairment  Oral Residue: None  Initiation of Swallow: No impairment  Laryngeal Elevation: Functional     Pharyngeal Phase Characteristics: No impairment, issues, or problems   Effective Modifications: None  Cues for Modifications: None        Oral Phase Severity: No impairment  Pharyngeal Phase Severity : No impairment     GCODESwallowing:  Swallow Current Status CH= 0%   Swallow Goal Status CH= 0%   Swallow D/C Status CH= 0%     The severity rating is based on the following outcomes:             Clinical Judgment     Pain:  Pt c/o 0/10 pain prior to evaluation. Pt c/o 0/10 pain post evaluation.      After treatment:   [ ]            Patient left in no apparent distress sitting up in chair  [X]            Patient left in no apparent distress in bed  [X]            Call bell left within reach  [X]            Nursing notified  [ ]            Caregiver present  [ ]            Bed alarm activated      COMMUNICATION/EDUCATION:   [X]            SLP educated pt with regard to compensatory swallow strategies and                  aspiration/reflux precautions including: small bites/sips,                  alternate liquids/solids, decrease feeding rate, HOB > 45 with all po, and                             upright in bed at 30 degrees after po for at least 45 minutes. [X]            Patient/family have participated as able in goal setting and plan of care.      Thank you for this referral.     Monica Wong M.S. CCC-SLP/L  Speech-Language Pathologist

## 2017-01-17 NOTE — PROGRESS NOTES
Bedside and Verbal report received from [address] [place] [address] (offgoing nurse) to Jimmy Reyes [facility]. LPN (ongoing nurse). Report included SBAR, MAR, Kardex, and results review.

## 2017-01-17 NOTE — ASSESSMENT & PLAN NOTE
Stage III C moderately differentiated T3, N2b (15/17+) with one tumor deposit, M0 (op reports indicate residual enlarged RP nodes, not excised   adjuvant 5FU/LCV 12/27/2016 3 wks on, 1 wk off

## 2017-01-17 NOTE — PROGRESS NOTES
Patient in NAD, denies abdominal pain, tolerating PO intake, still has some mild diarrhea, wants to go home. D/w patient and  at bedside. Home today.  D/w Dr Sandie Link

## 2017-01-17 NOTE — CONSULTS
[facility] [place]: [phone])  formerly [facility]: [address] [place] [address] Way: [phone]    Patient Name: Dorina Pineda  YOB: 1931    Date of Initial Consult: 1/17/17  Reason for Consult: Care Decisions  Requesting Provider: Blake Moore MD   Primary Care Physician: Mehran Umaña. Ghada Honeycutt MD      SUMMARY:   Dorina Pineda is a 80y.o. 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 Presented with diarrhea for several months, now with abdominal pain. She under care of Dr Tony Earl for Adenocarcinoma of cecum and has been receiving chemotherapy. Known portal vein thrombosis. PALLIATIVE DIAGNOSES:   1. Adenocarcinoma of cecum  2. Portal vein thrombosis  3. Abdominal pain  4. Nausea and vomiting       PLAN:   1. Palliative Medicine met with patient and spouse at bedside. Palliative care was introduced. Patient declined visit stating \"I don't think I need what you are selling\". Offered to meet with spouse if she was too tired for a visit. Spouse added \"We have been  for 67 years and we are getting along just fine\". He added that he \"worked for the fire department for 35 years and I know everything about this\". He added \"it sounds like a Druze like Confucianism or something\". I attempted to clarify that we are a support service and not at all a \"Rastafarian group\" and hoped to be of help to them. Patient then stated \"you are not a doctor so I don't think you can help\".     Discussed with nurse Ulysses Patino who indicated they seemed fine and she did have not see any red flags as far as signs of cognitive impairment. Wish we could have been of help. Psychosocial/Functional status:        2. Advance Care Planning: No AD        Code Status: FULL CODE    Durable DNR status:     4. Symptoms:     5. Disposition:     6. Initial consult note routed to primary continuity provider. 7. Communicated plan of care with: Palliative IDT        GOALS OF CARE:     [====Goals of Care====]  GOALS OF CARE:  Patient / health care proxy stated goals: Patient and spouse refused visit.       TREATMENT PREFERENCES:   Code Status: Full Code    Advance Care Planning:  Advance Care Planning 1/16/2017   Patient's Healthcare Decision Maker is: Legal Next of Kin   Primary Decision Maker Name -   Primary Decision Maker Phone Number -   Primary Decision Maker Relationship to Patient -   Secondary Decision Maker Name -   Secondary Decision [address] Phone Number -   Secondary Decision Maker Relationship to Patient -   Confirm Advance Directive Yes, not on file   Patient Would Like to Complete Advance Directive No       Other:    The palliative care team has discussed with patient / health care proxy about goals of care / treatment preferences for patient.  [====Goals of Care====]      Advance Care Planning 1/16/2017   Patient's Healthcare Decision Maker is: Legal Next of Kin   Primary Decision Maker Name -   Primary Decision [address] Phone Number -   Primary Decision Maker Relationship to Patient -   Secondary Decision [address] Name -   Secondary Decision [address] Phone Number -   Secondary Decision Maker Relationship to Patient -   Confirm Advance Directive Yes, not on file   Patient Would Like to Complete Advance Directive No      Patient's Healthcare Decision Maker is: Legal Next of [address]   Primary Decision Maker Name Rica Sudha   Primary Decision Maker Phone Number [phone]   Primary Decision Maker Relationship to Patient Adult child   Secondary Decision Maker Name Alan Gao Secondary Decision Maker Phone Number [phone]   Secondary Decision Maker Relationship to Patient Spouse   Confirm Advance Directive None   Patient Would Like to Complete Advance Directive No          HISTORY:     History obtained from:     CHIEF COMPLAINT: Abdominal pain    HPI/SUBJECTIVE:    The patient is:   [x] Verbal and participatory  [] Non-participatory due to:      SEE SUMMARY    Clinical Pain Assessment (nonverbal scale for nonverbal patients):           FUNCTIONAL ASSESSMENT:     Palliative Performance Scale (PPS):          PSYCHOSOCIAL/SPIRITUAL SCREENING:     Advance Care Planning:  Advance Care Planning 1/16/2017   Patient's Healthcare Decision Maker is: Legal Next of Kin   Primary Decision Maker Name -   Primary Decision [address] Phone Number -   Primary Decision Maker Relationship to Patient -   Secondary Decision [address] Name -   Secondary Decision [address] Phone Number -   Secondary Decision Maker Relationship to Patient -   Confirm Advance Directive Yes, not on file   Patient Would Like to Complete Advance Directive No        Any spiritual / Judaism concerns:  [] Yes /  [] No    Caregiver Burnout:  [] Yes /  [] No /  [] No Caregiver Present      Anticipatory grief assessment:   [] Normal  / [] Maladaptive       ESAS Anxiety:       ESAS Depression:          REVIEW OF SYSTEMS:     Positive and pertinent negative findings in ROS are noted above in HPI. The following systems were [] reviewed / [] unable to be reviewed as noted in HPI   Other findings are noted below. Systems: constitutional, ears/nose/mouth/throat, respiratory, gastrointestinal, genitourinary, musculoskeletal, integumentary, neurologic, psychiatric, endocrine. Positive findings noted below.   Modified ESAS Completed by: provider                                            PHYSICAL EXAM:     Wt Readings from Last 3 Encounters:   01/15/17 44.9 kg (99 lb)   01/13/17 44.9 kg (99 lb)   01/03/17 44.7 kg (98 lb 8 oz)     Blood pressure 98/51, pulse 79, temperature 97.7 °F (36.5 °C), resp. rate 18, height 5' 4\" (1.626 m), weight 44.9 kg (99 lb), SpO2 99 %. Pain:  Pain Scale 1: Numeric (0 - 10)  Pain Intensity 1: 0     Pain Location 1: Abdomen           Last bowel movement:     Constitutional:   Eyes: pupils equal, anicteric  ENMT: no nasal discharge, moist mucous membranes  Cardiovascular: regular rhythm, distal pulses intact  Respiratory: breathing not labored, symmetric  Gastrointestinal: soft non-tender, +bowel sounds  Musculoskeletal: no deformity, no tenderness to palpation  Skin: warm, dry  Neurologic: following commands, moving all extremities  Psychiatric: full affect, no hallucinations  Other:       HISTORY:     Active Problems:    Hypertension ()      Adenocarcinoma of cecum (HCC) (10/12/2016)      Portal vein thrombosis (1/15/2017)      Nausea & vomiting (1/15/2017)      Generalized abdominal pain (1/16/2017)      Anemia (1/16/2017)      Abdominal pain (1/16/2017)      Past Medical History   Diagnosis Date    Cancer ([address] 09/30/2016     Cecal CA Stage C, Dr. Minette Rubinstein (Heme/Onc), S/P Enterectomy/SB Resection [facility] Dr. Veronica Fierro. Daniel     Compression fracture of L5 lumbar vertebra ([address]     Gall bladder stones     Herniated disc     History of blood transfusion 01/03/2017     2 Units    Hyperlipidemia     Hypertension     Rapid heartbeat     Superior mesenteric vein thrombosis ([address] 09/27/2016    Thromboembolus ([address]      not sure      Past Surgical History   Procedure Laterality Date    Hx cholecystectomy      Hx small bowel resection  09/30/2016     Cecal CA Stage C, Dr. Minette Rubinstein (Heme/Onc), S/P Enterectomy/SB Resection [facility] Dr. Veronica Fierro. Daniel     Hx back surgery       x2      Family History   Problem Relation Age of Onset    Kidney Disease Mother     Other Father      stomach ulcer     History reviewed, no pertinent family history.   Social History   Substance Use Topics    Smoking status: Never Smoker    Smokeless tobacco: Never Used    Alcohol use No     No Known Allergies   Current Facility-Administered Medications   Medication Dose Route Frequency    iron sucrose (VENOFER) 300 mg in 0.9% sodium chloride 250 mL IVPB  300 mg IntraVENous ONCE    aspirin chewable tablet 81 mg  81 mg Oral DAILY    gabapentin (NEURONTIN) capsule 300 mg  300 mg Oral TID    metoprolol succinate (TOPROL-XL) XL tablet 25 mg  25 mg Oral DAILY    multivitamin, tx-iron-ca-min (THERA-M w/ IRON) tablet 1 Tab  1 Tab Oral DAILY    rivaroxaban (XARELTO) tablet 20 mg  20 mg Oral DAILY    simvastatin (ZOCOR) tablet 20 mg  20 mg Oral QHS    acetaminophen (TYLENOL) tablet 650 mg  650 mg Oral Q4H PRN    HYDROmorphone (PF) (DILAUDID) injection 0.5 mg  0.5 mg IntraVENous Q4H PRN    naloxone (NARCAN) injection 0.4 mg  0.4 mg IntraVENous PRN    ondansetron (ZOFRAN) injection 4 mg  4 mg IntraVENous Q4H PRN    metroNIDAZOLE (FLAGYL) tablet 500 mg  500 mg Oral TID        LAB AND IMAGING FINDINGS:     Lab Results   Component Value Date/Time    WBC 3.0 01/17/2017 04:20 AM    HGB 7.8 01/17/2017 04:20 AM    PLATELET 354 35/45/4929 04:20 AM     Lab Results   Component Value Date/Time    Sodium 144 01/17/2017 04:20 AM    Potassium 3.4 01/17/2017 04:20 AM    Chloride 113 01/17/2017 04:20 AM    CO2 20 01/17/2017 04:20 AM    BUN 13 01/17/2017 04:20 AM    Creatinine 0.44 01/17/2017 04:20 AM    Calcium 7.6 01/17/2017 04:20 AM    Magnesium 1.9 01/17/2017 04:20 AM      Lab Results   Component Value Date/Time     01/15/2017 05:10 PM    Alk.  phosphatase 95 01/15/2017 05:10 PM    Protein, total 6.1 01/15/2017 05:10 PM    Albumin 2.7 01/15/2017 05:10 PM    Globulin 3.4 01/15/2017 05:10 PM     Lab Results   Component Value Date/Time    INR 2.1 01/03/2017 12:20 PM    Prothrombin time 22.6 01/03/2017 12:20 PM    aPTT 33.2 01/03/2017 12:20 PM      No results found for: IRON, FE, TIBC, IBCT, PSAT, FERR   No results found for: PH, PCO2, PO2  No components found for: [place] No results found for: CPK, CKMB           Total time: 10--NO charge placed as services were refused. Counseling / coordination time:   > 50% counseling / coordination?:     Prolonged service was provided for  []30 min   []75 min in face to face time in the presence of the patient. Time Start:   Time End:   Note: this can only be billed with 82024 (initial) or 06574 (follow up). If multiple start / stop times, list each separately.     Letitia [address], FNP-BC, [facility] Palliative Medicine Nurse Practitioner

## 2017-01-17 NOTE — DISCHARGE INSTRUCTIONS
FusionStorm Activation    Thank you for requesting access to FusionStorm. Please follow the instructions below to securely access and download your online medical record. FusionStorm allows you to send messages to your doctor, view your test results, renew your prescriptions, schedule appointments, and more. How Do I Sign Up? 1. In your internet browser, go to [email or web address]  2. Click on the First Time User? Click Here link in the Sign In box. You will be redirect to the New Member Sign Up page. 3. Enter your FusionStorm Access Code exactly as it appears below. You will not need to use this code after youve completed the sign-up process. If you do not sign up before the expiration date, you must request a new code. FusionStorm Access Code: [email or web address]  Expires: 4/3/2017 12:17 PM (This is the date your FusionStorm access code will )    4. Enter the last four digits of your Social Security Number (xxxx) and Date of Birth (mm/dd/yyyy) as indicated and click Submit. You will be taken to the next sign-up page. 5. Create a FusionStorm ID. This will be your FusionStorm login ID and cannot be changed, so think of one that is secure and easy to remember. 6. Create a FusionStorm password. You can change your password at any time. 7. Enter your Password Reset Question and Answer. This can be used at a later time if you forget your password. 8. Enter your e-mail address. You will receive e-mail notification when new information is available in [address] 9. Click Sign Up. You can now view and download portions of your medical record. 10. Click the Download Summary menu link to download a portable copy of your medical information. Additional Information    If you have questions, please visit the Frequently Asked Questions section of the FusionStorm website at [email or web address] [email or web address]/[email or web address]/. Remember, FusionStorm is NOT to be used for urgent needs. For medical emergencies, dial 911.          Abdominal Pain: Care Instructions  Your Care Instructions    Abdominal pain has many possible causes. Some aren't serious and get better on their own in a few days. Others need more testing and treatment. If your pain continues or gets worse, you need to be rechecked and may need more tests to find out what is wrong. You may need surgery to correct the problem. Don't ignore new symptoms, such as fever, nausea and vomiting, urination problems, pain that gets worse, and dizziness. These may be signs of a more serious problem. Your doctor may have recommended a follow-up visit in the next 8 to 12 hours. If you are not getting better, you may need more tests or treatment. The doctor has checked you carefully, but problems can develop later. If you notice any problems or new symptoms, get medical treatment right away. Follow-up care is a key part of your treatment and safety. Be sure to make and go to all appointments, and call your doctor if you are having problems. It's also a good idea to know your test results and keep a list of the medicines you take. How can you care for yourself at home? · Rest until you feel better. · To prevent dehydration, drink plenty of fluids, enough so that your urine is light yellow or clear like water. Choose water and other caffeine-free clear liquids until you feel better. If you have kidney, heart, or liver disease and have to limit fluids, talk with your doctor before you increase the amount of fluids you drink. · If your stomach is upset, eat mild foods, such as rice, dry toast or crackers, bananas, and applesauce. Try eating several small meals instead of two or three large ones. · Wait until 48 hours after all symptoms have gone away before you have spicy foods, alcohol, and drinks that contain caffeine. · Do not eat foods that are high in fat. · Avoid anti-inflammatory medicines such as aspirin, ibuprofen (Advil, Motrin), and naproxen (Aleve). These can cause stomach upset.  Talk to your doctor if you take daily aspirin for another health problem. When should you call for help? Call 911 anytime you think you may need emergency care. For example, call if:  · You passed out (lost consciousness). · You pass maroon or very bloody stools. · You vomit blood or what looks like coffee grounds. · You have new, severe belly pain. Call your doctor now or seek immediate medical care if:  · Your pain gets worse, especially if it becomes focused in one area of your belly. · You have a new or higher fever. · Your stools are black and look like tar, or they have streaks of blood. · You have unexpected vaginal bleeding. · You have symptoms of a urinary tract infection. These may include:  ¨ Pain when you urinate. ¨ Urinating more often than usual.  ¨ Blood in your urine. · You are dizzy or lightheaded, or you feel like you may faint. Watch closely for changes in your health, and be sure to contact your doctor if:  · You are not getting better after 1 day (24 hours). Where can you learn more? Go to http://garrett-ya.info/. Enter M405 in the search box to learn more about \"Abdominal Pain: Care Instructions. \"  Current as of: May 27, 2016  Content Version: 11.1  © 3104-5407 [employer]. Care instructions adapted under license by [employer] (which disclaims liability or warranty for this information). If you have questions about a medical condition or this instruction, always ask your healthcare professional. [place] [address] any warranty or liability for your use of this information. CIDCO Activation    Thank you for requesting access to CIDCO. Please follow the instructions below to securely access and download your online medical record. CIDCO allows you to send messages to your doctor, view your test results, renew your prescriptions, schedule appointments, and more. How Do I Sign Up?    11.  In your internet browser, go to [email or web address]  12. Click on the First Time User? Click Here link in the Sign In box. You will be redirect to the New Member Sign Up page. 15. Enter your Offerboxx Access Code exactly as it appears below. You will not need to use this code after youve completed the sign-up process. If you do not sign up before the expiration date, you must request a new code. Offerboxx Access Code: [email or web address]  Expires: 4/3/2017 12:17 PM (This is the date your Offerboxx access code will )    14. Enter the last four digits of your Social Security Number (xxxx) and Date of Birth (mm/dd/yyyy) as indicated and click Submit. You will be taken to the next sign-up page. 15. Create a Nobex Techn[employer] ID. This will be your Offerboxx login ID and cannot be changed, so think of one that is secure and easy to remember. 12. Create a Offerboxx password. You can change your password at any time. 16. Enter your Password Reset Question and Answer. This can be used at a later time if you forget your password. 25. Enter your e-mail address. You will receive e-mail notification when new information is available in [address] 19. Click Sign Up. You can now view and download portions of your medical record. 20. Click the Download Summary menu link to download a portable copy of your medical information. Additional Information    If you have questions, please visit the Frequently Asked Questions section of the Offerboxx website at [email or web address] [facility]. DealBird/nPulse Technologieshart/. Remember, Offerboxx is NOT to be used for urgent needs. For medical emergencies, dial 911. DISCHARGE SUMMARY from Nurse    The following personal items are in your possession at time of discharge:    Dental Appliances: None  Visual Aid: Glasses     Home Medications: None  Jewelry: None  Clothing:  At bedside  Other Valuables: None         PATIENT INSTRUCTIONS:    After general anesthesia or intravenous sedation, for 24 hours or while taking prescription Narcotics:  · Limit your activities  · Do not drive and operate hazardous machinery  · Do not make important personal or business decisions  · Do  not drink alcoholic beverages  · If you have not urinated within 8 hours after discharge, please contact your surgeon on call. Report the following to your surgeon:  · Excessive pain, swelling, redness or odor of or around the surgical area  · Temperature over 100.5  · Nausea and vomiting lasting longer than 4 hours or if unable to take medications  · Any signs of decreased circulation or nerve impairment to extremity: change in color, persistent  numbness, tingling, coldness or increase pain  · Any questions        What to do at Home:  Recommended activity: {discharge activity:1    If you experience any of the following symptoms abdominal pain or bleeding please follow up with your PCP. *  Please give a list of your current medications to your Primary Care Provider. *  Please update this list whenever your medications are discontinued, doses are      changed, or new medications (including over-the-counter products) are added. *  Please carry medication information at all times in case of emergency situations. These are general instructions for a healthy lifestyle:    No smoking/ No tobacco products/ Avoid exposure to second hand smoke    Surgeon General's Warning:  Quitting smoking now greatly reduces serious risk to your health. Obesity, smoking, and sedentary lifestyle greatly increases your risk for illness    A healthy diet, regular physical exercise & weight monitoring are important for maintaining a healthy lifestyle    You may be retaining fluid if you have a history of heart failure or if you experience any of the following symptoms:  Weight gain of 3 pounds or more overnight or 5 pounds in a week, increased swelling in our hands or feet or shortness of breath while lying flat in bed.   Please call your doctor as soon as you notice any of these symptoms; do not wait until your next office visit. Recognize signs and symptoms of STROKE:    F-face looks uneven    A-arms unable to move or move unevenly    S-speech slurred or non-existent    T-time-call 911 as soon as signs and symptoms begin-DO NOT go       Back to bed or wait to see if you get better-TIME IS BRAIN. Warning Signs of HEART ATTACK     Call 911 if you have these symptoms:   Chest discomfort. Most heart attacks involve discomfort in the center of the chest that lasts more than a few minutes, or that goes away and comes back. It can feel like uncomfortable pressure, squeezing, fullness, or pain.  Discomfort in other areas of the upper body. Symptoms can include pain or discomfort in one or both arms, the back, neck, jaw, or stomach.  Shortness of breath with or without chest discomfort.  Other signs may include breaking out in a cold sweat, nausea, or lightheadedness. Don't wait more than five minutes to call 911 - MINUTES MATTER! Fast action can save your life. Calling 911 is almost always the fastest way to get lifesaving treatment. Emergency Medical Services staff can begin treatment when they arrive -- up to an hour sooner than if someone gets to the hospital by car. The discharge information has been reviewed with the patient. The patient verbalized understanding. Discharge medications reviewed with the patient and appropriate educational materials and side effects teaching were provided.

## 2017-01-17 NOTE — INTERDISCIPLINARY ROUNDS
IDR/SLIDR Summary          Patient: Keryr Lorenzo MRN: [id number]    Age: 80 y.o. YOB: 1931 Room/Bed: [facility]   Admit Diagnosis: Portal vein thrombosis  Nausea & vomiting  Abdominal pain  Portal vein thrombosis  Principal Diagnosis: <principal problem not specified>   Goals: Pain Control  Readmission: NO  Quality Measure: Not applicable  VTE Prophylaxis: Mechanical  Influenza Vaccine screening completed? YES  Pneumococcal Vaccine screening completed? YES  Mobility needs: Yes   Nutrition plan:Yes  Consults: P. T and Case Management    Financial concerns:No  Escalated to CM? NO  RRAT Score:    Interventions:na  Testing due for pt today? NO  LOS: 1 days Expected length of stay TBD days  Discharge plan: TBD   PCP: Stacy PONCE [facility], [place]  Transportation needs: No    Days before discharge:discharge pending  Discharge disposition:     Signed:     Cameron Patterson  1/17/2017  6:37 AM

## 2017-01-17 NOTE — PROGRESS NOTES
NUTRITION    BPA/MST Referral       RECOMMENDATIONS / PLAN:     - Advance diet as medically appropriate  - Continue RD inpatient monitoring and evaluation     NUTRITION INTERVENTIONS & DIAGNOSIS:     [x] Meals/Snacks: modified diet  [x] Vitamin and mineral supplementation: thera-m with iron     Nutrition Diagnosis:  Inadequate energy intake related to decreased/poor appetite as evidenced by poor po intake x 1 week PTA  Underweight related to inadequate energy intake as evidenced by BMI of 17 kg/m^2    ASSESSMENT:     Subjective:  Patient reported having poor appetite and po intake x 1 week PTA. Currently on clear liquid diet; reported tolerating with 50% po intake of meal. Appetite improving. Discussed adding nutrition supplement; pt decline at this time. Reported having diarrhea this morning; C-diff +. Denied having any nausea/vomiting  Current Appetite:   [] Good     [x] Fair     [] Poor     [] Other:  Appetite/meal intake prior to admission:   [] Good     [] Fair     [x] Poor (x 1 week)    [] Other:    Diet: DIET CLEAR LIQUID     Average po intake adequate to meet patients estimated nutritional needs:   [] Yes     [x] No   [] Unable to determine at this time  Current Meal Intake: No data found.      Food Allergies:  None known   Feeding Limitations:  [] Swallowing difficulty    [] Chewing difficulty    [] Other:    BM:  1/17 - diarrhea; C-diff +  Skin Integrity:   Edema:  None   Pertinent Medications: Reviewed     Labs:  Recent Labs      01/17/17   0420  01/15/17   1710   NA  144  140   K  3.4*  3.0*   CL  113*  104   CO2  20*  26   GLU  75  91   BUN  13  14   CREA  0.44*  0.55*   CA  7.6*  8.0*   MG  1.9   --    ALB   --   2.7*   SGOT   --   109*   ALT   --   65*       Intake/Output Summary (Last 24 hours) at 01/17/17 1440  Last data filed at 01/17/17 0600   Gross per 24 hour   Intake              180 ml   Output                0 ml   Net              180 ml       Anthropometrics:  Ht Readings from Last 1 Encounters:   01/15/17 5' 4\" (1.626 m)     Last 3 Recorded Weights in this Encounter    01/15/17 1645   Weight: 44.9 kg (99 lb)     Body mass index is 16.99 kg/(m^2). Underweight   83% IBW    Weight History:  Patient feels has lost 10 lb in past 1 week PTA. Per chart history, noted 9-10 lb weight loss in past 2.5 years PTA    Weight Metrics 1/15/2017 1/13/2017 1/3/2017 6/25/2015 12/27/2012   Weight 99 lb 99 lb 98 lb 8 oz 108 lb 115 lb   BMI 16.99 kg/m2 - 16.91 kg/m2 18.53 kg/m2 19.73 kg/m2        Admitting Diagnosis: Portal vein thrombosis  Nausea & vomiting  Abdominal pain  Portal vein thrombosis  Past Medical History   Diagnosis Date    Cancer ([facility] [address] 09/30/2016     Cecal CA Stage C, Dr. Silvia Wheeler (Heme/Onc), S/P Enterectomy/SB Resection [facility] Sparks [address] Dr. Teresa Campos. 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/2016    [facility]      not sure       Education Needs:        [x] None identified  [] Identified - Not appropriate at this time  []  Identified and addressed - refer to education log  Learning Limitations:   [x] None identified  [] Identified    Cultural, Moravian & ethnic food preferences:  [x] None identified    [] Identified and addressed     ESTIMATED NUTRITION NEEDS:     Calories: 8682-1100 kcal (HBEx1.2-1.3) based on  [] Actual BW:      [x] IBW: 55 kg  Protein: 55-66 gm (1-1.2 gm/kg) based on  [] Actual BW:      [x] IBW:  55 kg  Fluid: 1 mL/kcal     MONITORING & EVALUATION:     Nutrition Goal(s):  1. Po intake of meals will meet >75% of patient estimated nutritional needs within the next 5-7 days.   Outcome:  [] Met/Ongoing    []  Not Met    [x] New/Initial Goal     Monitoring:   [x] Monitor meal intake    [] Monitor diet tolerance     [] Monitor supplement intake    Previous Recommendations (for follow-up assessments only):     []   Implemented       []   Not Implemented (RD to address)     [] No Recommendation Made     Discharge Planning:  Cardiac diet   [x] Participated in care planning, discharge planning, & interdisciplinary rounds as appropriate      Ric Phipps, [address] [place] [address]   Pager: [phone]

## 2017-01-17 NOTE — PROGRESS NOTES
Pt with her spouse at bedside, decline [place] [id number] services offered. Pt  This couple reports that they take care of each other. Pt will be transported home by her  who is at bedside.

## 2017-01-17 NOTE — ROUTINE PROCESS
Bedside and Verbal shift change report given to Buddy Dorantes LPN (oncoming nurse) by Roxann Garner RN (offgoing nurse). Report included the following information SBAR, Kardex, Intake/Output, MAR, Recent Results and Med Rec Status.

## 2017-01-17 NOTE — PROGRESS NOTES
MEDICAL CONDITION 40    Patients Name: Giancarlo Garcia   Account Number: [de-identified]  [facility] Date:     A Medicare \"inpatient admission\" may be changed to \"outpatient (included outpatient observation) status, if the following exists: The change in patient status from inpatient to outpatient is made prior to discharge or release while the patient is still a patient in the hospital.    The hospital has not submitted a claim for the inpatient admission. A physician concurs with the Utilization Committee decision. Physician concurrence with the Utilization Committee's decision is documented in the patient's medical record. Condition 44 should be implemented within 24 hours of admission, to facilitate the change of Medicare inpatients, not meeting inpatient level of care criteria, to an outpatient status (includes outpatient observation), prior to discharge. Implement Condition 44    Date:1/17/2017    Inpatient status has been reviewed/ reconsidered prior to discharge. Change to outpatient/outpatient observation status, in accordance with Condition 44.    The following parties concur:    Physician Name [id number]    Utilization Committee Representative name (Care Management may represent a non-physician member of the [facility])   [id number] [employer]   1/17/2017    Change Effective Date and Time:    1/17/2017  Reason for Change:  Admission Order:

## 2017-01-17 NOTE — PROGRESS NOTES
Care Management Interventions  PCP Verified by CM: Yes (DR. Reese Nichols)  Palliative Care Consult (Criteria: CHF and RRAT>21): No  Reason for No Palliative Care Consult: Other (see comment) (NO ORDER)  Mode of Transport at Discharge:  Other (see comment) (PT'S SPOUSE WILL TAKE CARE OF ME)  Transition of Care Consult (CM Consult): Discharge Planning  Current Support Network: Lives with Spouse, Own Home, Family Lives Nearby  Confirm Follow Up Transport: Family  Plan discussed with Pt/Family/Caregiver: Yes (SPOUSE AND WIFE(PT) DISCUSSED THIS PLAN)  Freedom of Choice Offered: Yes (PT DECLINED [address] [place] [address])  Discharge Location  Discharge Placement: Home

## 2017-01-18 NOTE — PROGRESS NOTES
OT orders received and chart reviewed. Patient is discharged prior to OT evaluation. Will defer OT to the next level of care.  Thank you for the referral.    [facility] OTR/L